# Patient Record
Sex: FEMALE | Race: WHITE | NOT HISPANIC OR LATINO | Employment: OTHER
[De-identification: names, ages, dates, MRNs, and addresses within clinical notes are randomized per-mention and may not be internally consistent; named-entity substitution may affect disease eponyms.]

---

## 2017-10-18 ENCOUNTER — SURGERY (OUTPATIENT)
Age: 72
End: 2017-10-18

## 2017-10-18 ENCOUNTER — HOSPITAL ENCOUNTER (OUTPATIENT)
Facility: CLINIC | Age: 72
Discharge: HOME | End: 2017-10-18
Attending: COLON & RECTAL SURGERY | Admitting: COLON & RECTAL SURGERY
Payer: MEDICARE

## 2017-10-18 VITALS
RESPIRATION RATE: 23 BRPM | WEIGHT: 153 LBS | DIASTOLIC BLOOD PRESSURE: 80 MMHG | SYSTOLIC BLOOD PRESSURE: 108 MMHG | HEART RATE: 73 BPM | OXYGEN SATURATION: 97 % | HEIGHT: 64 IN | BODY MASS INDEX: 26.12 KG/M2

## 2017-10-18 PROBLEM — F41.1 GENERALIZED ANXIETY DISORDER: Status: ACTIVE | Noted: 2017-10-18

## 2017-10-18 PROBLEM — Z83.49 FAMILY HISTORY OF OTHER ENDOCRINE AND METABOLIC DISEASES(V18.19): Status: ACTIVE | Noted: 2017-10-18

## 2017-10-18 PROBLEM — R32 URINARY INCONTINENCE: Status: ACTIVE | Noted: 2017-10-18

## 2017-10-18 PROBLEM — M89.9 DISORDER OF BONE AND CARTILAGE: Status: ACTIVE | Noted: 2017-10-18

## 2017-10-18 PROBLEM — R13.10 DYSPHAGIA: Status: ACTIVE | Noted: 2017-10-18

## 2017-10-18 PROBLEM — R00.2 PALPITATIONS: Status: ACTIVE | Noted: 2017-10-18

## 2017-10-18 PROBLEM — H35.379 MACULAR PUCKER: Status: ACTIVE | Noted: 2017-10-18

## 2017-10-18 PROBLEM — Z87.891 PERSONAL HISTORY OF TOBACCO USE, PRESENTING HAZARDS TO HEALTH: Status: ACTIVE | Noted: 2017-10-18

## 2017-10-18 PROBLEM — R26.89 IMBALANCE: Status: ACTIVE | Noted: 2017-10-18

## 2017-10-18 PROBLEM — R09.89 LABILE BLOOD PRESSURE: Status: ACTIVE | Noted: 2017-10-18

## 2017-10-18 PROBLEM — J30.0 VASOMOTOR RHINITIS: Status: ACTIVE | Noted: 2017-10-18

## 2017-10-18 PROBLEM — R60.9 EDEMA: Status: ACTIVE | Noted: 2017-10-18

## 2017-10-18 PROBLEM — E78.00 ELEVATED CHOLESTEROL: Status: ACTIVE | Noted: 2017-10-18

## 2017-10-18 PROBLEM — M79.18 MUSCULOSKELETAL PAIN: Status: ACTIVE | Noted: 2017-10-18

## 2017-10-18 PROBLEM — M94.9 DISORDER OF BONE AND CARTILAGE: Status: ACTIVE | Noted: 2017-10-18

## 2017-10-18 PROBLEM — K57.32 DIVERTICULITIS OF COLON: Status: ACTIVE | Noted: 2017-10-18

## 2017-10-18 PROBLEM — R73.01 IMPAIRED FASTING GLUCOSE: Status: ACTIVE | Noted: 2017-10-18

## 2017-10-18 LAB — COLONOSCOPY: NORMAL

## 2017-10-18 PROCEDURE — 88305 TISSUE EXAM BY PATHOLOGIST: CPT | Performed by: COLON & RECTAL SURGERY

## 2017-10-18 PROCEDURE — 88305 TISSUE EXAM BY PATHOLOGIST: CPT | Mod: 26 | Performed by: COLON & RECTAL SURGERY

## 2017-10-18 PROCEDURE — G0500 MOD SEDAT ENDO SERVICE >5YRS: HCPCS | Performed by: COLON & RECTAL SURGERY

## 2017-10-18 PROCEDURE — 99153 MOD SED SAME PHYS/QHP EA: CPT | Performed by: COLON & RECTAL SURGERY

## 2017-10-18 PROCEDURE — 25000128 H RX IP 250 OP 636: Performed by: COLON & RECTAL SURGERY

## 2017-10-18 PROCEDURE — 45385 COLONOSCOPY W/LESION REMOVAL: CPT | Mod: PT | Performed by: COLON & RECTAL SURGERY

## 2017-10-18 RX ORDER — FLUMAZENIL 0.1 MG/ML
0.2 INJECTION, SOLUTION INTRAVENOUS
Status: DISCONTINUED | OUTPATIENT
Start: 2017-10-18 | End: 2017-10-18 | Stop reason: HOSPADM

## 2017-10-18 RX ORDER — LIDOCAINE 40 MG/G
CREAM TOPICAL
Status: DISCONTINUED | OUTPATIENT
Start: 2017-10-18 | End: 2017-10-18 | Stop reason: HOSPADM

## 2017-10-18 RX ORDER — FENTANYL CITRATE 50 UG/ML
INJECTION, SOLUTION INTRAMUSCULAR; INTRAVENOUS PRN
Status: DISCONTINUED | OUTPATIENT
Start: 2017-10-18 | End: 2017-10-18 | Stop reason: HOSPADM

## 2017-10-18 RX ORDER — ONDANSETRON 2 MG/ML
4 INJECTION INTRAMUSCULAR; INTRAVENOUS
Status: DISCONTINUED | OUTPATIENT
Start: 2017-10-18 | End: 2017-10-18 | Stop reason: HOSPADM

## 2017-10-18 RX ORDER — ONDANSETRON 4 MG/1
4 TABLET, ORALLY DISINTEGRATING ORAL EVERY 6 HOURS PRN
Status: DISCONTINUED | OUTPATIENT
Start: 2017-10-18 | End: 2017-10-18 | Stop reason: HOSPADM

## 2017-10-18 RX ORDER — PROCHLORPERAZINE MALEATE 5 MG
5 TABLET ORAL EVERY 6 HOURS PRN
Status: DISCONTINUED | OUTPATIENT
Start: 2017-10-18 | End: 2017-10-18 | Stop reason: HOSPADM

## 2017-10-18 RX ORDER — MULTIPLE VITAMINS W/ MINERALS TAB 9MG-400MCG
1 TAB ORAL DAILY
COMMUNITY
End: 2022-09-09

## 2017-10-18 RX ORDER — L. ACIDOPHILUS/L.BULGARICUS 1MM CELL
TABLET ORAL
COMMUNITY
End: 2022-09-09

## 2017-10-18 RX ORDER — LORAZEPAM 0.5 MG/1
0.25 TABLET ORAL
COMMUNITY

## 2017-10-18 RX ORDER — NALOXONE HYDROCHLORIDE 0.4 MG/ML
.1-.4 INJECTION, SOLUTION INTRAMUSCULAR; INTRAVENOUS; SUBCUTANEOUS
Status: DISCONTINUED | OUTPATIENT
Start: 2017-10-18 | End: 2017-10-18 | Stop reason: HOSPADM

## 2017-10-18 RX ORDER — CHLORAL HYDRATE 500 MG
2 CAPSULE ORAL DAILY
COMMUNITY

## 2017-10-18 RX ORDER — ONDANSETRON 2 MG/ML
4 INJECTION INTRAMUSCULAR; INTRAVENOUS EVERY 6 HOURS PRN
Status: DISCONTINUED | OUTPATIENT
Start: 2017-10-18 | End: 2017-10-18 | Stop reason: HOSPADM

## 2017-10-18 RX ADMIN — MIDAZOLAM HYDROCHLORIDE 2 MG: 1 INJECTION, SOLUTION INTRAMUSCULAR; INTRAVENOUS at 10:27

## 2017-10-18 RX ADMIN — MIDAZOLAM HYDROCHLORIDE 1 MG: 1 INJECTION, SOLUTION INTRAMUSCULAR; INTRAVENOUS at 10:33

## 2017-10-18 RX ADMIN — FENTANYL CITRATE 100 MCG: 50 INJECTION, SOLUTION INTRAMUSCULAR; INTRAVENOUS at 10:26

## 2017-10-18 NOTE — H&P
Pre-Endoscopy History and Physical     Soledad Angel MRN# [id number]   YOB: 1945 Age: 72 year old     Date of Procedure: 10/18/2017  Primary care provider: Sara Gunn  Type of Endoscopy: colonoscopy  Reason for Procedure: screening  Type of Anesthesia Anticipated: moderate sedation    HPI:    Soledad is a 72 year old female who will be undergoing the above procedure.  Patient had a normal colonoscopy other than diverticulosis in 2009. Patient has had multiple episodes of diverticulitis in the past few years. She is careful about her fiber diet and fluid intake. She denies any bleeding.     A history and physical has been performed. The patient's medications and allergies have been reviewed. The risks and benefits of the procedure and the sedation options and risks were discussed with the patient.  All questions were answered and informed consent was obtained.      She denies a personal or family history of anesthesia complications or bleeding disorders.     Prior to Admission medications    Medication Sig Start Date End Date Taking? Authorizing Provider   ASPIRIN PO    Yes Reported, Patient   fluticasone (FLOVENT DISKUS) 50 MCG/BLIST AEPB Inhale 1 puff into the lungs every 12 hours   Yes Reported, Patient   INULIN PO Take 2.5 mg by mouth   Yes Reported, Patient   LORAZEPAM PO Take 0.5 mg by mouth   Yes Reported, Patient   fish oil-omega-3 fatty acids 1000 MG capsule Take 2 g by mouth daily   Yes Reported, Patient   multivitamin, therapeutic with minerals (MULTI-VITAMIN) TABS tablet Take 1 tablet by mouth daily   Yes Reported, Patient   Cholecalciferol (VITAMIN D-3 PO)    Yes Reported, Patient   Multiple Vitamins-Calcium (VIACTIV MULTI-VITAMIN) CHEW    Yes Reported, Patient   lactobacillus TABS    Yes Reported, Patient       Allergies   Allergen Reactions     Ciprofloxacin         No current facility-administered medications for this encounter.        Patient Active Problem List   Diagnosis  "    Vasomotor rhinitis     Urinary incontinence     Palpitations     Musculoskeletal pain     Macular pucker     Disorder of bone and cartilage     Labile blood pressure     Impaired fasting glucose     Imbalance     Personal history of tobacco use, presenting hazards to health     Family history of other endocrine and metabolic diseases(V18.19)     Elevated cholesterol     Edema     Dysphagia     Diverticulitis of colon     Generalized anxiety disorder     Abnormal CT scan        Past Medical History:   Diagnosis Date     Diverticular disease         Past Surgical History:   Procedure Laterality Date     TONSILLECTOMY         Social History   Substance Use Topics     Smoking status: Former Smoker     Types: Cigarettes     Quit date: 5/15/1999     Smokeless tobacco: Never Used     Alcohol use Yes      Comment: 2 glasses of wine/week maximum       History reviewed. No pertinent family history.    REVIEW OF SYSTEMS:     5 point ROS negative except as noted above in HPI, including Gen., Resp., CV, GI &  system review.      PHYSICAL EXAM:   /90  Pulse 73  Resp 18  Ht 1.626 m (5' 4\")  Wt 69.4 kg (153 lb)  SpO2 95%  BMI 26.26 kg/m2 Estimated body mass index is 26.26 kg/(m^2) as calculated from the following:    Height as of this encounter: 1.626 m (5' 4\").    Weight as of this encounter: 69.4 kg (153 lb).   GENERAL APPEARANCE: healthy  MENTAL STATUS: alert  AIRWAY EXAM: Mallampatti Class II (visualization of the soft palate, fauces, and uvula)  RESP: lungs clear to auscultation - no rales, rhonchi or wheezes  CV: regular rates and rhythm      DIAGNOSTICS:    Not indicated      IMPRESSION   ASA Class 2 - Mild systemic disease        PLAN:       Colonoscopy with possible polypectomy, possible biopsy. The indications, procedure and risks were explained to the patient who agrees to proceed.       The above has been forwarded to the consulting provider.      Signed Electronically by: Viktoriya South  October " 18, 2017

## 2017-10-18 NOTE — BRIEF OP NOTE
Brief Operative Note    Pre-operative diagnosis: DIVERTICULITIS   Post-operative diagnosis colon polyp, diverticulosis     Procedure: Procedure(s):  COLONOSCOPY - Wound Class: II-Clean Contaminated   Surgeon(s): Surgeon(s) and Role:     * Viktoriya South MD - Primary   Estimated blood loss: * No values recorded between 10/18/2017 12:00 AM and 10/18/2017 10:56 AM *    Specimens:   ID Type Source Tests Collected by Time Destination   A : hot snare Polyp Large Intestine, Right/Ascending SURGICAL PATHOLOGY EXAM Masha Jim RN 10/18/2017 10:48 AM       Findings: See Provation procedure note in Epic

## 2017-10-19 LAB — COPATH REPORT: NORMAL

## 2017-10-25 ENCOUNTER — HOSPITAL ENCOUNTER (INPATIENT)
Facility: CLINIC | Age: 72
Setting detail: SURGERY ADMIT
End: 2017-10-25
Attending: COLON & RECTAL SURGERY | Admitting: COLON & RECTAL SURGERY
Payer: MEDICARE

## 2017-11-04 ENCOUNTER — APPOINTMENT (OUTPATIENT)
Dept: GENERAL RADIOLOGY | Facility: CLINIC | Age: 72
End: 2017-11-04
Attending: PHYSICIAN ASSISTANT
Payer: MEDICARE

## 2017-11-04 ENCOUNTER — APPOINTMENT (OUTPATIENT)
Dept: GENERAL RADIOLOGY | Facility: CLINIC | Age: 72
End: 2017-11-04
Attending: EMERGENCY MEDICINE
Payer: MEDICARE

## 2017-11-04 ENCOUNTER — HOSPITAL ENCOUNTER (EMERGENCY)
Facility: CLINIC | Age: 72
Discharge: HOME | End: 2017-11-04
Attending: PHYSICIAN ASSISTANT | Admitting: PHYSICIAN ASSISTANT
Payer: MEDICARE

## 2017-11-04 VITALS
WEIGHT: 153 LBS | DIASTOLIC BLOOD PRESSURE: 100 MMHG | HEIGHT: 64 IN | TEMPERATURE: 98 F | SYSTOLIC BLOOD PRESSURE: 149 MMHG | RESPIRATION RATE: 16 BRPM | BODY MASS INDEX: 26.12 KG/M2 | OXYGEN SATURATION: 96 %

## 2017-11-04 DIAGNOSIS — S52.592A OTHER CLOSED FRACTURE OF DISTAL END OF LEFT RADIUS, INITIAL ENCOUNTER: ICD-10-CM
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
## 2017-11-04 DIAGNOSIS — S52.615A CLOSED NONDISPLACED FRACTURE OF STYLOID PROCESS OF LEFT ULNA, INITIAL ENCOUNTER: ICD-10-CM

## 2017-11-04 PROCEDURE — 40000277 XR SURGERY CARM FLUORO LESS THAN 5 MIN W STILLS

## 2017-11-04 PROCEDURE — A9270 NON-COVERED ITEM OR SERVICE: HCPCS | Mod: GY | Performed by: PHYSICIAN ASSISTANT

## 2017-11-04 PROCEDURE — 73110 X-RAY EXAM OF WRIST: CPT | Mod: LT

## 2017-11-04 PROCEDURE — 99285 EMERGENCY DEPT VISIT HI MDM: CPT | Mod: 25

## 2017-11-04 PROCEDURE — 40000986 XR WRIST LT  2 VW: Mod: LT

## 2017-11-04 PROCEDURE — 25600 CLTX DST RDL FX/EPHYS SEP WO: CPT | Mod: LT

## 2017-11-04 PROCEDURE — 25000132 ZZH RX MED GY IP 250 OP 250 PS 637: Mod: GY | Performed by: PHYSICIAN ASSISTANT

## 2017-11-04 RX ORDER — OXYCODONE HYDROCHLORIDE 5 MG/1
5 TABLET ORAL EVERY 6 HOURS PRN
Qty: 15 TABLET | Refills: 0 | Status: SHIPPED | OUTPATIENT
Start: 2017-11-04 | End: 2017-12-21

## 2017-11-04 RX ORDER — ACETAMINOPHEN 500 MG
1000 TABLET ORAL ONCE
Status: COMPLETED | OUTPATIENT
Start: 2017-11-04 | End: 2017-11-04

## 2017-11-04 RX ADMIN — ACETAMINOPHEN 1000 MG: 500 TABLET, FILM COATED ORAL at 17:14

## 2017-11-04 NOTE — ED NOTES
"Emergency Department Attending Supervision Note  11/4/2017  4:22 PM      I evaluated this patient in conjunction with Sherrie Fried PA-C.    Briefly, the patient presented with a mechanical fall. The patient tripped while walking outside and fell onto her left wrist without loss of consciousness. The patient presents to the emergency department because of the pain in her wrist/deformity. The patient has normal sensation in her left fingers, with some decreased movement and \"shooting pain\" with movement. Of note, the patient is right handed.     On my exam:  General: Patient in mild distress.  Alert and cooperative with exam. Normal mentation  HEENT: NC/AT. Conjunctiva without injection or scleral icterus. External ears normal.  Respiratory: Breathing comfortably on room air  CV: Normal rate, all extremities well perfused  GI:  Non-distended abdomen  Skin: Warm, dry, no rashes/open wounds on exposed skin  Musculoskeletal: LUE: CMS intact, obvious deformity of the wrist with dorsal displacement of the distal fracture fragment. Elbow and shoulder exam normal.   Neuro: Alert, answers questions appropriately. No gross motor deficits    Procedures:    Reduction     SITE: Left wrist     PROCEDURE PROVIDER: Dr. Navarrete     REDUCTION COMMENTS: The patient's left wrist was held in traction and internally and externally rotated until a \"pop\" was felt. The patient's let wrist then appeared reduced with improved alignment. Post reductions X-rays were obtained and showed improved reduction.     Splint Placement    PLACEMENT: A plaster sugar tong splint was applied to the left upper extremity and after placement I checked and adjusted the fit to ensure proper positioning. The patient was more comfortable with the splint in place. Sensation and circulation are intact after splint placement.       Course:  1658: I reduced the patient's wrist.   1724: I reentered the room to recheck the patient.  1754: I went back to speak to the " patient after her x-ray's came back showing little improvement post reduction and split placement. The patient will be moved to a Stab room so that I can repair the arm again.  1803: The patient was moved to Stab 1.  1833: I entered the room to place a new cast on the patient, as the first one had been ineffective.   1835: I exited the room.  1840: I reentered the room to complete the procedure, and to make sure everything was in place.     My impression:  Soledad Angel is a 72 year old female who presents for evaluation of wrist pain after fall. CMS is intact distally in the extremity. Initial attempt at fracture reduction demonstrated only mild improvement, second reduction done using C-arm again with only somewhat improved alignment; fracture comminuted/unstable. There is no indication for ortho consultation from the ED. Rather, close follow-up is indicated in the next days.  Splint and fracture precautions for home.      Diagnosis:    ICD-10-CM    1. Other closed fracture of distal end of left radius, initial encounter S52.592A    2. Closed nondisplaced fracture of styloid process of left ulna, initial encounter S52.615A    3. Fall, initial encounter W19.XXXA      Scribe Disclosure:  I, Ibis Packer, am serving as a scribe at 4:22 PM on 11/4/2017 to document services personally performed by Anton Navarrete DO based on my observations and the provider's statements to me.            Anton Navarrete DO  11/05/17 2236

## 2017-11-04 NOTE — ED AVS SNAPSHOT
Emergency Department    [address] [facility] [address]    Phone:  [phone]    Fax:  [phone]                                       Soledad Angel   MRN: [id number]    Department:   Emergency Department   Date of Visit:  11/4/2017           Patient Information     Date Of Birth          1945        Your diagnoses for this visit were:     Other closed fracture of distal end of left radius, initial encounter     Closed nondisplaced fracture of styloid process of left ulna, initial encounter     Fall, initial encounter        You were seen by Sherrie Fried PA-C and Anton Navarrete DO.      Follow-up Information     Follow up with  Emergency Department.    Specialty:  EMERGENCY MEDICINE    Why:  If symptoms worsen    Contact information:    [address] [facility] [address] [phone]        Follow up with Orthopaedics, [facility] In 3 days.    Contact information:    [address] [address] [phone]          Discharge Instructions         Rest, elevate, ibuprofen or tylenol for pain. Tramadol for severe pain.   Follow up with orthopedics in 3 days. Contact information provided.   Return if worsening pain, finger discoloration, or any other new concerns.     Discharge Instructions  Splint Care    You had a splint put on today to help protect your injury and help it heal.  Splints are used to treat things like strains, sprains, large cuts, and fractures (broken bones). Splints are temporary and are designed to allow for swelling.    Be sure your splint is not too tight!  If you splint is too tight, it may cause loss of blood supply.  Signs of your splint being too tight include: your arm or leg hurting a lot more; your fingers or toes getting numb, cold, pale or blue; or your child is crying, fussing or seeming restless.    Generally, every Emergency Department visit should have a follow-up clinic visit with  either a primary or a specialty clinic/provider. Please follow-up as instructed by your emergency provider today.  Return to the Emergency Department right away if:    You have increased pain or pressure around the injury.    You have numbness, tingling, or cool, pale, or blue toes or fingers past the injury.    Your child is more fussy than normal, crying a lot, or restless.    Your splint becomes soft, breaks, or is wet.    Your splint begins to smell bad.    Your splint is cutting into your skin.    Home care:    Keep the injured area above the level of your heart while laying or sitting down.  This will help decrease the swelling and the pain.    Keep the splint dry.    Do not put objects down or inside the splint.    If there is an elastic bandage (Ace  wrap) holding the splint on this may be loosened slightly to relieve pressure or pain.  If pain continues return to the Emergency Department right away.    Do not remove your splint by yourself unless told to by your provider.    Follow-up:  Sometimes the splint put on in the Emergency Department needs to be changed once the swelling has gone down and a more permanent cast needs to be placed.  This is usually done by a bone specialist provider (Orthopedist).  Follow the instructions given to you by your provider today.    If you were given a prescription for medicine here today, be sure to read all of the information (including the package insert) that comes with your prescription.  This will include important information about the medicine, its side effects, and any warnings that you need to know about.  The pharmacist who fills the prescription can provide more information and answer questions you may have about the medicine.  If you have questions or concerns that the pharmacist cannot address, please call or return to the Emergency Department.     Remember that you can always come back to the Emergency Department if you are not able to see your regular  provider in the amount of time listed above, if you get any new symptoms, or if there is anything that worries you.      24 Hour Appointment Hotline       To make an appointment at any [facility], call [phone] ([phone]). If you don't have a family doctor or clinic, we will help you find one. [place] clinics are conveniently located to serve the needs of you and your family.             Review of your medicines      START taking        Dose / Directions Last dose taken    oxyCODONE IR 5 MG tablet   Commonly known as:  ROXICODONE   Dose:  5 mg   Quantity:  15 tablet        Take 1 tablet (5 mg) by mouth every 6 hours as needed for pain   Refills:  0          Our records show that you are taking the medicines listed below. If these are incorrect, please call your family doctor or clinic.        Dose / Directions Last dose taken    ASPIRIN PO        Refills:  0        fish oil-omega-3 fatty acids 1000 MG capsule   Dose:  2 g        Take 2 g by mouth daily   Refills:  0        fluticasone 50 MCG/BLIST Aepb   Commonly known as:  FLOVENT DISKUS   Dose:  1 puff        Inhale 1 puff into the lungs every 12 hours   Refills:  0        INULIN PO   Dose:  2.5 mg        Take 2.5 mg by mouth   Refills:  0        lactobacillus Tabs        Refills:  0        LORAZEPAM PO   Dose:  0.5 mg        Take 0.5 mg by mouth   Refills:  0        Multi-vitamin Tabs tablet   Dose:  1 tablet        Take 1 tablet by mouth daily   Refills:  0        VIACTIV MULTI-VITAMIN Chew        Refills:  0        VITAMIN D-3 PO        Refills:  0                Prescriptions were sent or printed at these locations (1 Prescription)                   Other Prescriptions                Printed at Department/Unit printer (1 of 1)         oxyCODONE IR (ROXICODONE) 5 MG tablet                Procedures and tests performed during your visit     IV access    Wrist XR, G/E 3 views, left    XR Surgery RICAHRD L/T 5 Min Fluoro w Stills    XR Wrist Left 2  Views      Orders Needing Specimen Collection     None      Pending Results     Date and Time Order Name Status Description    11/4/2017 1901 XR Surgery RICHARD L/T 5 Min Fluoro w Stills Preliminary             Pending Culture Results     No orders found from 11/2/2017 to 11/5/2017.            Pending Results Instructions     If you had any lab results that were not finalized at the time of your Discharge, you can call the ED Lab Result RN at [phone]. You will be contacted by this team for any positive Lab results or changes in treatment. The nurses are available 7 days a week from 10A to 6:30P.  You can leave a message 24 hours per day and they will return your call.        Test Results From Your Hospital Stay        11/4/2017  4:58 PM      Narrative     WRIST LEFT THREE OR MORE VIEWS   11/4/2017 4:03 PM     HISTORY: Pain after trauma.    COMPARISON: None.    FINDINGS: Moderately displaced fracture of the distal left radial  shaft with marked dorsal angulation of the distal fracture fragment.  There is also an ulnar styloid fracture.        Impression     IMPRESSION: Fracture distal left radial shaft and ulnar styloid.    TIMOTHY CONNOR MD               11/4/2017  5:47 PM      Narrative     XR WRIST LT  2 VW 11/4/2017 5:45 PM    HISTORY: Reduction.    COMPARISON: November 4, 2017.        Impression     IMPRESSION: Interval placement of overlying cast material, which  limits evaluation. Comminuted intra-articular fracture of the distal  left radius, as before, with fracture fragments slightly improved in  alignment compared to prior exam.    SKIP HARRISON MD         11/4/2017  7:15 PM      Narrative     SURGERY C-ARM FLUOROSCOPY LESS THAN 5 MINUTES WITH STILLS   11/4/2017  7:02 PM     HISTORY: Postreduction.    COMPARISON: Postreduction casted view earlier today.    FINDINGS: Two C-arm views of the left wrist show fracture of the  distal left radial shaft with dorsal angulation of the distal fracture  fragment  and moderate displacement. Ulnar styloid fracture was seen on  the earlier study but not clearly seen on the C-arm views.        Impression     IMPRESSION: Distal left radial shaft fracture with dorsal angulation  of the distal fracture fragment.                Clinical Quality Measure: Blood Pressure Screening     Your blood pressure was checked while you were in the emergency department today. The last reading we obtained was  BP: (!) 142/96 . Please read the guidelines below about what these numbers mean and what you should do about them.  If your systolic blood pressure (the top number) is less than 120 and your diastolic blood pressure (the bottom number) is less than 80, then your blood pressure is normal. There is nothing more that you need to do about it.  If your systolic blood pressure (the top number) is 120-139 or your diastolic blood pressure (the bottom number) is 80-89, your blood pressure may be higher than it should be. You should have your blood pressure rechecked within a year by a primary care provider.  If your systolic blood pressure (the top number) is 140 or greater or your diastolic blood pressure (the bottom number) is 90 or greater, you may have high blood pressure. High blood pressure is treatable, but if left untreated over time it can put you at risk for heart attack, stroke, or kidney failure. You should have your blood pressure rechecked by a primary care provider within the next 4 weeks.  If your provider in the emergency department today gave you specific instructions to follow-up with your doctor or provider even sooner than that, you should follow that instruction and not wait for up to 4 weeks for your follow-up visit.        Thank you for choosing [facility]       Thank you for choosing [facility] for your care. Our goal is always to provide you with excellent care. Hearing back from our patients is one way we can continue to improve our services. Please take a few minutes to  "complete the written survey that you may receive in the mail after you visit with us. Thank you!        [employer] Information     [employer] lets you send messages to your doctor, view your test results, renew your prescriptions, schedule appointments and more. To sign up, go to [email or web address] . Click on \"Log in\" on the left side of the screen, which will take you to the Welcome page. Then click on \"Sign up Now\" on the right side of the page.     You will be asked to enter the access code listed below, as well as some personal information. Please follow the directions to create your username and password.     Your access code is: [email or web address]  Expires: 2018  5:50 PM     Your access code will  in 90 days. If you need help or a new code, please call your [facility] clinic or [phone].        Care EveryWhere ID     This is your Care EveryWhere ID. This could be used by other organizations to access your [facility] medical records  [id number]        Equal Access to Services     ELLEN VO : Hadii kwame cummings Sopam, waaxda luqadaha, qaybta kaalmajayden avalos, nell dos santos . So [facility] [phone].    ATENCIÓN: Si habla español, tiene a mendiola disposición servicios gratuitos de asistencia lingüística. Llame al [phone].    We comply with applicable federal civil rights laws and [place] laws. We do not discriminate on the basis of race, color, national origin, age, disability, sex, sexual orientation, or gender identity.            After Visit Summary       This is your record. Keep this with you and show to your community pharmacist(s) and doctor(s) at your next visit.                  "

## 2017-11-04 NOTE — DISCHARGE INSTRUCTIONS
Rest, elevate, ibuprofen or tylenol for pain. Tramadol for severe pain.   Follow up with orthopedics in 3 days. Contact information provided.   Return if worsening pain, finger discoloration, or any other new concerns.     Discharge Instructions  Splint Care    You had a splint put on today to help protect your injury and help it heal.  Splints are used to treat things like strains, sprains, large cuts, and fractures (broken bones). Splints are temporary and are designed to allow for swelling.    Be sure your splint is not too tight!  If you splint is too tight, it may cause loss of blood supply.  Signs of your splint being too tight include: your arm or leg hurting a lot more; your fingers or toes getting numb, cold, pale or blue; or your child is crying, fussing or seeming restless.    Generally, every Emergency Department visit should have a follow-up clinic visit with either a primary or a specialty clinic/provider. Please follow-up as instructed by your emergency provider today.  Return to the Emergency Department right away if:    You have increased pain or pressure around the injury.    You have numbness, tingling, or cool, pale, or blue toes or fingers past the injury.    Your child is more fussy than normal, crying a lot, or restless.    Your splint becomes soft, breaks, or is wet.    Your splint begins to smell bad.    Your splint is cutting into your skin.    Home care:    Keep the injured area above the level of your heart while laying or sitting down.  This will help decrease the swelling and the pain.    Keep the splint dry.    Do not put objects down or inside the splint.    If there is an elastic bandage (Ace  wrap) holding the splint on this may be loosened slightly to relieve pressure or pain.  If pain continues return to the Emergency Department right away.    Do not remove your splint by yourself unless told to by your provider.    Follow-up:  Sometimes the splint put on in the Emergency  Department needs to be changed once the swelling has gone down and a more permanent cast needs to be placed.  This is usually done by a bone specialist provider (Orthopedist).  Follow the instructions given to you by your provider today.    If you were given a prescription for medicine here today, be sure to read all of the information (including the package insert) that comes with your prescription.  This will include important information about the medicine, its side effects, and any warnings that you need to know about.  The pharmacist who fills the prescription can provide more information and answer questions you may have about the medicine.  If you have questions or concerns that the pharmacist cannot address, please call or return to the Emergency Department.     Remember that you can always come back to the Emergency Department if you are not able to see your regular provider in the amount of time listed above, if you get any new symptoms, or if there is anything that worries you.

## 2017-11-04 NOTE — ED PROVIDER NOTES
"I saw the above patient and shared service with Dr. Tate. Please see his brief note for additional details.      History     Chief Complaint:  Left wrist injury    HPI   Soledad Angel is a right hand dominant 72 year old female who presents to the emergency department for evaluation of a left wrist injury after a fall. [place] reports tripping on a piece of the side walk that was in the process of being repaired, falling onto her left wrist. She denies hitting her head and any loss of consciousness during the fall. The immediate onset of pain and deformity in her left wrist prompted the patient to seek evaluation here in emergency department. Here, the patient reports normal sensation in her left fingers and reports decreased movement in her fingers. She notes shooting pain in her fingers up into her wrist if she tries to move her thumb and pointer finger. She denies left shoulder pain and left elbow pain. She denies sustaining any other injuries during the mechanical fall. She is not on any blood thinners.     Allergies:  Ciprofloxacin    Medications:    Fluticasone  Inulin  Lorazepam  lactobacillus    Past Medical History:    Vasomotor rhinitis  Urinary incontinence  Palpitations  Hypercholesteremia   Anxiety  Diverticular disease    Past Surgical History:    Tonsillectomy    Family History:    No past pertinent family history.    Social History:  Former smoker: quit date 5/15/1999  Alcohol use: 2 glasses a week     Review of Systems   Musculoskeletal:        Positive for left wrist pain.    All other systems reviewed and are negative.    Physical Exam     Patient Vitals for the past 24 hrs:   BP Temp Temp src Resp SpO2 Height Weight   11/04/17 1936 - - - 16 - - -   11/04/17 1930 (!) 149/100 - - - 96 % - -   11/04/17 1924 - - - 16 - - -   11/04/17 1710 (!) 142/96 - - - - - -   11/04/17 1554 (!) 162/100 98  F (36.7  C) Temporal 18 - 1.626 m (5' 4\") " 69.4 kg (153 lb)   11/04/17 1548 - - - - 91 % - -          Physical Exam  Nursing note and vitals reviewed.     GENERAL: Alert, mild distress.   HEENT: Normal conjunctiva. No scleral icterus. MMM.  NECK: Supple. Normal ROM.   CARDIAC: Intact distal radial pulses 2+ and symmetric. Capillary refill less than 2 seconds.   PULMONARY: Breathing comfortably at rest.    NEURO: Alert and oriented. Non-focal. Sensation intact to light touch distally in left upper extremity.   MUSCULOSKELETAL:   Left hand: no tenderness or swelling over dorsum of the hand, no focal tenderness over scaphoid; flexion and extension of all fingers intact.  Left wrist: obvious deformity to distal wrist with associated tenderness and swelling; no breaks in the skin. Limited ROM due to pain.   Left elbow: no tenderness or swelling, full flexion and extension intact.   SKIN: Skin is warm and dry. No rashes. No pallor or jaundice.   PSYCH: Normal affect and mood.      Emergency Department Course   Imaging:  Radiographic findings were communicated with the patient who voiced understanding of the findings.    XR wrist, left, G/E 3 views:   Fracture distal left radial shaft and ulnar styloid. As per radiology.     XR wrist, left 2 views: Post-reduction  Interval placement of overlying cast material, which  limits evaluation. Comminuted intra-articular fracture of the distal  left radius, as before, with fracture fragments slightly improved in  alignment compared to prior exam. As per radiology.     XR Surgery RICHARD L/T 5 Min Fluoro w Stills:  Distal left radial shaft fracture with dorsal angulation  of the distal fracture fragment. As per radiology.     Procedures:  Hematoma block, reduction, and plaster sugar tong performed by Dr. Beltre. Please see his note for further details.     Interventions:  1714 Tylenol 1000 mg PO    Emergency Department Course:  1550 Nursing notes and vitals reviewed.  I performed an exam of the patient as documented above.      IV inserted. Medicine administered as documented above. Blood drawn. This was sent to the lab for further testing, results above.    The patient was sent for a left wrist xray while in the emergency department, findings above.     1646 I rechecked the patient and discussed the results of her workup thus far.     The patient had her left wrist reduced by Dr. Beltre while here in the emergency department. See Dr. Beltre's notes regarding procedure details.     The patient was sent for a post-reduction xray. See findings above. These were explained to the patient.     Per post-reduction xray findings, Dr. Beltre elected to try reducing the fracture again. See Dr. Beltre's note for details.    Findings and plan explained to the Patient. Patient discharged home with instructions regarding supportive care, medications, and reasons to return. The importance of close follow-up was reviewed. The patient was prescribed Oxycodone.     I personally answered all related questions prior to discharge.   Impression & Plan    Medical Decision Making:  This is a 72 year old right hand dominant female who presents with left wrist deformity and pain after a mechanical fall. No head injury or LOC and she is not on blood thinners. She has an obvious deformity on exam and xrays reveal a fracture of distal radial shaft and ulnar styloid. She is neurovascularly intact in this extremity. No signs of compartment syndrome. This did require reduction for more appropriate alignment. Dr. Tate performed a hematoma block followed by reduction, which was repeated for more appropriate alignment. See his procedure note for further details. Alignment slightly improved. She was immobilized with sugar tong splint, see Dr. Tate's procedure note for further details. She remained neurovascularly intact following this procedure. I discussed the possibility of this requiring surgery, though she will need to have further evaluation/follow up with  orthopedics this week. Contact information for [facility] provided. Advised to take tylenol or ibuprofen for pain and will provide oxycodone for more severe pain. Reviewed to avoid drinking alcohol or driving if taking the narcotic medication and side effects of constipation reviewed. No other acute injuries noted on exam. Reviewed reasons to return to ED, including worsening symptoms, finger discoloration, numbness, fevers, or any new concerns. The patient was in agreement with plan and discharged in satisfactory condition with all questions answered.      Of note, blood pressure elevated here. No prior history of hypertension. Suspect related to pain. No clinical history concerning for end organ damage. Advised to have rechecked with PCP.     Diagnosis:    ICD-10-CM    1. Other closed fracture of distal end of left radius, initial encounter S52.592A    2. Closed nondisplaced fracture of styloid process of left ulna, initial encounter S52.615A    3. Fall, initial encounter W19.XXXA        Disposition:  discharged to home    Discharge Medications:  New Prescriptions    OXYCODONE IR (ROXICODONE) 5 MG TABLET    Take 1 tablet (5 mg) by mouth every 6 hours as needed for pain       Juanita BENITO, am serving as a scribe on 11/4/2017 at 3:50 PM to personally document services performed by Sherrie Fried PA-C based on my observations and the provider's statements to me.     Juanita Wise  11/4/2017    EMERGENCY DEPARTMENT       Sherrie Fried PA-C  11/05/17 1110

## 2017-11-04 NOTE — ED AVS SNAPSHOT
Emergency Department    [address] [facility] [address]    Phone:  [phone]    Fax:  [phone]                                       Soledad Angel   MRN: [id number]    Department:   Emergency Department   Date of Visit:  11/4/2017           After Visit Summary Signature Page     I have received my discharge instructions, and my questions have been answered. I have discussed any challenges I see with this plan with the nurse or doctor.    ..........................................................................................................................................  Patient/Patient Representative Signature      ..........................................................................................................................................  Patient Representative Print Name and Relationship to Patient    ..................................................               ................................................  Date                                            Time    ..........................................................................................................................................  Reviewed by Signature/Title    ...................................................              ..............................................  Date                                                            Time

## 2017-11-04 NOTE — Clinical Note
Tylenol dosing   Regular strength: 650 mg every 4 to 6 hours  Extra strength: 1000 mg every 6 hours  Maximum daily dose: 3000 mg daily

## 2017-12-20 ENCOUNTER — HOSPITAL ENCOUNTER (EMERGENCY)
Facility: CLINIC | Age: 72
Discharge: HOME | End: 2017-12-21
Attending: EMERGENCY MEDICINE | Admitting: EMERGENCY MEDICINE
Payer: MEDICARE

## 2017-12-20 DIAGNOSIS — R10.30 ABDOMINAL PAIN, LOWER: ICD-10-CM

## 2017-12-20 DIAGNOSIS — R14.0 ABDOMINAL BLOATING: ICD-10-CM

## 2017-12-20 PROCEDURE — 99282 EMERGENCY DEPT VISIT SF MDM: CPT

## 2017-12-20 NOTE — ED AVS SNAPSHOT
Emergency Department    [address] [facility] [address]    Phone:  [phone]    Fax:  [phone]                                       Soledad Angel   MRN: [id number]    Department:   Emergency Department   Date of Visit:  12/20/2017           After Visit Summary Signature Page     I have received my discharge instructions, and my questions have been answered. I have discussed any challenges I see with this plan with the nurse or doctor.    ..........................................................................................................................................  Patient/Patient Representative Signature      ..........................................................................................................................................  Patient Representative Print Name and Relationship to Patient    ..................................................               ................................................  Date                                            Time    ..........................................................................................................................................  Reviewed by Signature/Title    ...................................................              ..............................................  Date                                                            Time

## 2017-12-20 NOTE — ED AVS SNAPSHOT
Emergency Department    [address] [facility] [address]    Phone:  [phone]    Fax:  [phone]                                       Soledad Angel   MRN: [id number]    Department:   Emergency Department   Date of Visit:  12/20/2017           Patient Information     Date Of Birth          1945        Your diagnoses for this visit were:     Abdominal pain, lower     Abdominal bloating        You were seen by Kyle Mack MD.      Follow-up Information     Please follow up.    Why:  start Augmentin tonight - return if worsening or any new concerns        Discharge Instructions         Probable Diverticulitis    Some people get pouches along the wall of the colon as they get older. The pouches, called diverticuli, usually cause no symptoms. If the pouches become blocked, you can get an infection. This infection is called diverticulitis. It causes pain in your lower abdomen and fever. If not treated, it can become a serious condition, causing an abscess to form inside the pouch. The abscess may block the intestinal tract even or rupture, spreading infection throughout the abdomen.  When treatment is started early, oral antibiotics alone may be enough to cure diverticulitis. This method is tried first. But, if you don't improve or if your condition gets worse while using oral antibiotics, you may need to be admitted to the hospital for IV antibiotics. Severe cases may require surgery.  Home care  The following guidelines will help you care for yourself at home:    During the acute illness, rest and follow your healthcare provider's instructions about diet. Sometimes you will need to follow a clear liquid diet to rest your bowel. Once your symptoms are better, you may be told to follow a low-fiber diet for some time. Include foods like:    Flake cereal, mashed potatoes, pancakes, waffles, pasta, white bread, rice, applesauce, bananas, eggs, fish, poultry, tofu, and cooked soft  vegetables    Take antibiotics exactly as instructed. Don't miss any doses or stop taking the medication, even if you feel better.    Monitor your temperature and tell your healthcare provider if you have rising temperatures.  Preventing future attacks  Once you have an episode of diverticulitis, you are at risk for having it again. After you have recovered from this episode, you may be able to lower your risk by eating a high-fiber diet (20 gm/day to 35 gm/day of fiber). This cleans out the colon pouches that already exist and may prevent new ones from forming. Foods high in fiber include fresh fruits and edible peelings, raw or lightly cooked vegetables, whole grain cereals and breads, dried beans and peas, and bran.  Other steps that can help prevent future attacks include:    Take your medicines, such as antibiotics, as your healthcare provider says.    Drink 6 to 8 glasses of water every day, unless told otherwise.    Use a heating pad or hot water bottle to help abdominal cramping or pain.    Begin an exercise program. Ask your healthcare provider how to get started. You can benefit from simple activities such as walking or gardening.    Treat diarrhea with a bland diet. Start with liquids only; then slowly add fiber over time.    Watch for changes in your bowel movements (constipation to diarrhea). Avoid constipation by eating a high fiber diet and taking a stool softener if needed.    Get plenty of rest and sleep.  Follow-up care  Follow up with your healthcare provider as advised or sooner if you are not getting better in the next 2 days.  When to seek medical advice  Call your healthcare provider right away if any of these occur:    Fever of 100.4 F (38 C) or higher, or as directed by your healthcare provider    Repeated vomiting or swelling of the abdomen    Weakness, dizziness, light-headedness    Pain in your abdomen that gets worse, severe, or spreads to your back    Pain that moves to the right lower  abdomen    Rectal bleeding (stools that are red, black or maroon color)    Unexpected vaginal bleeding  Date Last Reviewed: 9/1/2016 2000-2017 [employer]. [address] [place] [address], [place] [address]. All rights reserved. This information is not intended as a substitute for professional medical care. Always follow your healthcare professional's instructions.          24 Hour Appointment Hotline       To make an appointment at any [facility] [place], call [phone] ([phone]). If you don't have a family doctor or clinic, we will help you find one. [facility] are conveniently located to serve the needs of you and your family.             Review of your medicines      START taking        Dose / Directions Last dose taken    amoxicillin-clavulanate 875-125 MG per tablet   Commonly known as:  AUGMENTIN   Dose:  1 tablet   Quantity:  20 tablet        Take 1 tablet by mouth 2 times daily for 10 days   Refills:  0          Our records show that you are taking the medicines listed below. If these are incorrect, please call your family doctor or clinic.        Dose / Directions Last dose taken    ASPIRIN PO   Dose:  81 mg        Take 81 mg by mouth every other day   Refills:  0        fish oil-omega-3 fatty acids 1000 MG capsule   Dose:  2 g        Take 2 g by mouth daily   Refills:  0        fluticasone 50 MCG/BLIST Aepb   Commonly known as:  FLOVENT DISKUS   Dose:  1 puff        Inhale 1 puff into the lungs every 12 hours   Refills:  0        INULIN PO   Dose:  2.5 mg        Take 2.5 mg by mouth   Refills:  0        lactobacillus Tabs        Refills:  0        LORAZEPAM PO   Dose:  0.5 mg        Take 0.5 mg by mouth   Refills:  0        Multi-vitamin Tabs tablet   Dose:  1 tablet        Take 1 tablet by mouth daily   Refills:  0        VIACTIV MULTI-VITAMIN Chew        Refills:  0        VITAMIN D-3 PO        Refills:  0                Prescriptions were sent or printed at these locations (1  Prescription)                   [id number]  [facility] - [address] [facility]   [address] [place] [address]    Telephone:  [phone]   Fax:  [phone]   Hours:                  E-Prescribed (1 of 1)         amoxicillin-clavulanate (AUGMENTIN) 875-125 MG per tablet                Orders Needing Specimen Collection     None      Pending Results     No orders found for last 3 day(s).            Pending Culture Results     No orders found for last 3 day(s).            Pending Results Instructions     If you had any lab results that were not finalized at the time of your Discharge, you can call the ED Lab Result RN at [phone]. You will be contacted by this team for any positive Lab results or changes in treatment. The nurses are available 7 days a week from 10A to 6:30P.  You can leave a message 24 hours per day and they will return your call.        Test Results From Your Hospital Stay               Clinical Quality Measure: Blood Pressure Screening     Your blood pressure was checked while you were in the emergency department today. The last reading we obtained was  BP: (!) 138/94 . Please read the guidelines below about what these numbers mean and what you should do about them.  If your systolic blood pressure (the top number) is less than 120 and your diastolic blood pressure (the bottom number) is less than 80, then your blood pressure is normal. There is nothing more that you need to do about it.  If your systolic blood pressure (the top number) is 120-139 or your diastolic blood pressure (the bottom number) is 80-89, your blood pressure may be higher than it should be. You should have your blood pressure rechecked within a year by a primary care provider.  If your systolic blood pressure (the top number) is 140 or greater or your diastolic blood pressure (the bottom number) is 90 or greater, you may have high blood pressure. High blood pressure is  "treatable, but if left untreated over time it can put you at risk for heart attack, stroke, or kidney failure. You should have your blood pressure rechecked by a primary care provider within the next 4 weeks.  If your provider in the emergency department today gave you specific instructions to follow-up with your doctor or provider even sooner than that, you should follow that instruction and not wait for up to 4 weeks for your follow-up visit.        Thank you for choosing [facility]       Thank you for choosing [facility] for your care. Our goal is always to provide you with excellent care. Hearing back from our patients is one way we can continue to improve our services. Please take a few minutes to complete the written survey that you may receive in the mail after you visit with us. Thank you!        [employer] Information     [facility] lets you send messages to your doctor, view your test results, renew your prescriptions, schedule appointments and more. To sign up, go to [email or web address] . Click on \"Log in\" on the left side of the screen, which will take you to the Welcome page. Then click on \"Sign up Now\" on the right side of the page.     You will be asked to enter the access code listed below, as well as some personal information. Please follow the directions to create your username and password.     Your access code is: [email or web address]  Expires: 2018  4:50 PM     Your access code will  in 90 days. If you need help or a new code, please call your [facility] clinic or [phone].        Care EveryWhere ID     This is your Care EveryWhere ID. This could be used by other organizations to access your [facility] medical records  [id number]        Equal Access to Services     [facility] GILDA : Alexandra Liang, radha coello, nell camacho. So [facility] [phone].    ATENCIÓN: Si habla español, tiene a mendiola disposición servicios gratuitos de " asistencia lingüística. Katia al [phone].    We comply with applicable federal civil rights laws and [place] laws. We do not discriminate on the basis of race, color, national origin, age, disability, sex, sexual orientation, or gender identity.            After Visit Summary       This is your record. Keep this with you and show to your community pharmacist(s) and doctor(s) at your next visit.

## 2017-12-21 VITALS
OXYGEN SATURATION: 93 % | BODY MASS INDEX: 26.29 KG/M2 | HEART RATE: 84 BPM | SYSTOLIC BLOOD PRESSURE: 138 MMHG | DIASTOLIC BLOOD PRESSURE: 94 MMHG | HEIGHT: 64 IN | WEIGHT: 154 LBS | TEMPERATURE: 98.2 F | RESPIRATION RATE: 16 BRPM

## 2017-12-21 ASSESSMENT — ENCOUNTER SYMPTOMS
BLOOD IN STOOL: 0
FATIGUE: 1
CHILLS: 0
NAUSEA: 0
FEVER: 0
ABDOMINAL PAIN: 1
VOMITING: 0
DIAPHORESIS: 0

## 2017-12-21 NOTE — ED PROVIDER NOTES
"  History     Chief Complaint:  Abdominal Pain    HPI   Soledad Angel is a 72 year old female with a history of diverticulitis who presents to the emergency department today evaluation of lower bilateral abdominal pain. She reports feeling her stomach \"changing\" in the past week with more gas and bloating, which she does not usually get with her diverticulitis. Yesterday, she could feel an onset of pain and took milk of magnesia. This morning, she's had lower abdominal constant similar to her previous episodes of diverticulitis prompting her visit to the emergency department for further evaluation. At arrival, she rates her pain at 4/10 and worse with movement and associated malaise. She denies black or bloody stool, urinary symptoms, fever, body aches, chills, nausea, vomiting, sweats, and use of pain medications. Of note, her most recent bowel movement was this morning. Her most recent case of diverticulitis was July 2017 and Augmentin typically alleviates her diverticulitis.    Allergies:  Ciprofloxacin    Medications:    Aspirin PO  INULIN PO  lactobacillus TABS  fluticasone (FLOVENT DISKUS) 50 MCG/BLIST AEPB  LORAZEPAM PO    Past Medical History:    Diverticular disease    Past Surgical History:    Tonsillectomy    Family History:    History reviewed. No pertinent family history.     Social History:  The patient was alone.  Smoking Status: former  Smokeless Tobacco: never  Alcohol Use: Yes  Marital Status:  Single     Review of Systems   Constitutional: Positive for fatigue. Negative for chills, diaphoresis and fever.        Malaise   Gastrointestinal: Positive for abdominal pain. Negative for blood in stool, nausea and vomiting.   Genitourinary: Negative.    All other systems reviewed and are negative.    Physical Exam   First Vitals:  BP: (!) 158/93  Heart Rate: 104  Temp: 98.2  F (36.8  C)  Resp: 16  Height: 162.6 cm (5' 4\")  Weight: 69.9 kg (154 lb)  SpO2: 99 %    Physical Exam  SKIN:  Warm, dry.  "   HEMATOLOGIC/IMMUNOLOGIC/LYMPHATIC:  No pallor.  EYES:  Conjunctivae normal.  CARDIOVASCULAR:  Regular rate and rhythm.  RESPIRATORY:  No respiratory distress, breath sounds equal and normal.  GASTROINTESTINAL:  Soft tender abdomen at the LLQ with normal active bowel sounds.  Mild distension.  No abdominal mass.   MUSCULOSKELETAL:  ROM of the torso does not exacerbate the abdominal pain.  NEUROLOGIC:  Alert, conversant.  PSYCHIATRIC:  Normal mood.    Emergency Department Course     Emergency Department Course:  Nursing notes and vitals reviewed.  0002: I performed an exam of the patient as documented above.   Findings and plan explained to the Patient. Patient discharged home with instructions regarding supportive care, medications, and reasons to return. The importance of close follow-up was reviewed. The patient was prescribed Augmentin.  I personally answered all related questions with the patient prior to discharge.    Impression & Plan      Medical Decision Making:  Soledad Angel presents with sounds to be a recurrent bout of diverticulitis. A reassuring non-surgical abdomen on examination. She states this feels very similar if not identical to previous episodes of diverticulitis. She does not feel too ill for outpatient treatment. Given this presentation and examination findings and otherwise she is feeling systemically well. I did not think she required evaluation. I opted to treat her empirically for diverticulitis and I prescribed Augmentin as that has been effective in the past. I advised to start her Augmentin tonight and return here if feeling worse in the coming days.      Diagnosis:    ICD-10-CM    1. Abdominal pain, lower R10.30    2. Abdominal bloating R14.0      Disposition:  Discharged to home    Discharge Medications:      Details   amoxicillin-clavulanate (AUGMENTIN) 875-125 MG per tablet Take 1 tablet by mouth 2 times daily for 10 days, Disp-20 tablet, R-0, E-Prescribe          Scribe  Disclosure:  I, Yolie Jacome, am serving as a scribe at 12:02 AM on 12/21/2017 to document services personally performed by Kyle Mack MD based on my observations and the provider's statements to me.     12/20/2017    EMERGENCY DEPARTMENT       Kyle Mack MD  12/24/17 0902

## 2017-12-21 NOTE — ED NOTES
Patient presents to ED with c/o lower abdominal pain X 2 days. Pt reports excess flatulence for the past week. Pt denies any n/v/d. Pt afebrile. Pt reports hx of diverticulitis and reports this pain feels the same as diverticulitis. Pt reports she is supposed to have a colectomy next Friday d/t repeat episodes of diverticulitis. VSS at this time., will continue to monitor, call light in reach.

## 2017-12-21 NOTE — DISCHARGE INSTRUCTIONS
Probable Diverticulitis    Some people get pouches along the wall of the colon as they get older. The pouches, called diverticuli, usually cause no symptoms. If the pouches become blocked, you can get an infection. This infection is called diverticulitis. It causes pain in your lower abdomen and fever. If not treated, it can become a serious condition, causing an abscess to form inside the pouch. The abscess may block the intestinal tract even or rupture, spreading infection throughout the abdomen.  When treatment is started early, oral antibiotics alone may be enough to cure diverticulitis. This method is tried first. But, if you don't improve or if your condition gets worse while using oral antibiotics, you may need to be admitted to the hospital for IV antibiotics. Severe cases may require surgery.  Home care  The following guidelines will help you care for yourself at home:    During the acute illness, rest and follow your healthcare provider's instructions about diet. Sometimes you will need to follow a clear liquid diet to rest your bowel. Once your symptoms are better, you may be told to follow a low-fiber diet for some time. Include foods like:    Flake cereal, mashed potatoes, pancakes, waffles, pasta, white bread, rice, applesauce, bananas, eggs, fish, poultry, tofu, and cooked soft vegetables    Take antibiotics exactly as instructed. Don't miss any doses or stop taking the medication, even if you feel better.    Monitor your temperature and tell your healthcare provider if you have rising temperatures.  Preventing future attacks  Once you have an episode of diverticulitis, you are at risk for having it again. After you have recovered from this episode, you may be able to lower your risk by eating a high-fiber diet (20 gm/day to 35 gm/day of fiber). This cleans out the colon pouches that already exist and may prevent new ones from forming. Foods high in fiber include fresh fruits and edible peelings,  raw or lightly cooked vegetables, whole grain cereals and breads, dried beans and peas, and bran.  Other steps that can help prevent future attacks include:    Take your medicines, such as antibiotics, as your healthcare provider says.    Drink 6 to 8 glasses of water every day, unless told otherwise.    Use a heating pad or hot water bottle to help abdominal cramping or pain.    Begin an exercise program. Ask your healthcare provider how to get started. You can benefit from simple activities such as walking or gardening.    Treat diarrhea with a bland diet. Start with liquids only; then slowly add fiber over time.    Watch for changes in your bowel movements (constipation to diarrhea). Avoid constipation by eating a high fiber diet and taking a stool softener if needed.    Get plenty of rest and sleep.  Follow-up care  Follow up with your healthcare provider as advised or sooner if you are not getting better in the next 2 days.  When to seek medical advice  Call your healthcare provider right away if any of these occur:    Fever of 100.4 F (38 C) or higher, or as directed by your healthcare provider    Repeated vomiting or swelling of the abdomen    Weakness, dizziness, light-headedness    Pain in your abdomen that gets worse, severe, or spreads to your back    Pain that moves to the right lower abdomen    Rectal bleeding (stools that are red, black or maroon color)    Unexpected vaginal bleeding  Date Last Reviewed: 9/1/2016 2000-2017 [employer]. [address] [place] [id number]. All rights reserved. This information is not intended as a substitute for professional medical care. Always follow your healthcare professional's instructions.

## 2022-09-09 ENCOUNTER — HOSPITAL ENCOUNTER (OUTPATIENT)
Facility: CLINIC | Age: 77
Setting detail: OBSERVATION
Discharge: HOME | End: 2022-09-10
Attending: EMERGENCY MEDICINE | Admitting: INTERNAL MEDICINE
Payer: MEDICARE

## 2022-09-09 ENCOUNTER — APPOINTMENT (OUTPATIENT)
Dept: CT IMAGING | Facility: CLINIC | Age: 77
End: 2022-09-09
Attending: EMERGENCY MEDICINE
Payer: MEDICARE

## 2022-09-09 ENCOUNTER — APPOINTMENT (OUTPATIENT)
Dept: ULTRASOUND IMAGING | Facility: CLINIC | Age: 77
End: 2022-09-09
Attending: EMERGENCY MEDICINE
Payer: MEDICARE

## 2022-09-09 DIAGNOSIS — R06.03 ACUTE RESPIRATORY DISTRESS: ICD-10-CM

## 2022-09-09 DIAGNOSIS — I26.94 MULTIPLE SUBSEGMENTAL PULMONARY EMBOLI WITHOUT ACUTE COR PULMONALE (H): ICD-10-CM

## 2022-09-09 LAB
ANION GAP SERPL CALCULATED.3IONS-SCNC: 7 MMOL/L (ref 3–14)
ATRIAL RATE - MUSE: 109 BPM
BASOPHILS # BLD AUTO: 0.1 10E3/UL (ref 0–0.2)
BASOPHILS NFR BLD AUTO: 1 %
BUN SERPL-MCNC: 13 MG/DL (ref 7–30)
CALCIUM SERPL-MCNC: 9.1 MG/DL (ref 8.5–10.1)
CHLORIDE BLD-SCNC: 107 MMOL/L (ref 94–109)
CO2 SERPL-SCNC: 24 MMOL/L (ref 20–32)
CREAT SERPL-MCNC: 0.54 MG/DL (ref 0.52–1.04)
D DIMER PPP FEU-MCNC: 3.26 UG/ML FEU (ref 0–0.5)
DIASTOLIC BLOOD PRESSURE - MUSE: NORMAL MMHG
EOSINOPHIL # BLD AUTO: 0.3 10E3/UL (ref 0–0.7)
EOSINOPHIL NFR BLD AUTO: 4 %
ERYTHROCYTE [DISTWIDTH] IN BLOOD BY AUTOMATED COUNT: 12.8 % (ref 10–15)
GFR SERPL CREATININE-BSD FRML MDRD: >90 ML/MIN/1.73M2
GLUCOSE BLD-MCNC: 126 MG/DL (ref 70–99)
HCT VFR BLD AUTO: 45.6 % (ref 35–47)
HGB BLD-MCNC: 14.9 G/DL (ref 11.7–15.7)
HOLD SPECIMEN: NORMAL
HOLD SPECIMEN: NORMAL
IMM GRANULOCYTES # BLD: 0 10E3/UL
IMM GRANULOCYTES NFR BLD: 0 %
INTERPRETATION ECG - MUSE: NORMAL
LYMPHOCYTES # BLD AUTO: 2.3 10E3/UL (ref 0.8–5.3)
LYMPHOCYTES NFR BLD AUTO: 33 %
MCH RBC QN AUTO: 30.8 PG (ref 26.5–33)
MCHC RBC AUTO-ENTMCNC: 32.7 G/DL (ref 31.5–36.5)
MCV RBC AUTO: 94 FL (ref 78–100)
MONOCYTES # BLD AUTO: 0.5 10E3/UL (ref 0–1.3)
MONOCYTES NFR BLD AUTO: 7 %
NEUTROPHILS # BLD AUTO: 3.8 10E3/UL (ref 1.6–8.3)
NEUTROPHILS NFR BLD AUTO: 55 %
NRBC # BLD AUTO: 0 10E3/UL
NRBC BLD AUTO-RTO: 0 /100
NT-PROBNP SERPL-MCNC: 160 PG/ML (ref 0–900)
P AXIS - MUSE: 21 DEGREES
PLATELET # BLD AUTO: 249 10E3/UL (ref 150–450)
POTASSIUM BLD-SCNC: 3.8 MMOL/L (ref 3.4–5.3)
PR INTERVAL - MUSE: 154 MS
QRS DURATION - MUSE: 108 MS
QT - MUSE: 342 MS
QTC - MUSE: 460 MS
R AXIS - MUSE: -32 DEGREES
RBC # BLD AUTO: 4.83 10E6/UL (ref 3.8–5.2)
SARS-COV-2 RNA RESP QL NAA+PROBE: NEGATIVE
SODIUM SERPL-SCNC: 138 MMOL/L (ref 133–144)
SYSTOLIC BLOOD PRESSURE - MUSE: NORMAL MMHG
T AXIS - MUSE: -7 DEGREES
TROPONIN I SERPL HS-MCNC: 44 NG/L
UFH PPP CHRO-ACNC: >1.1 IU/ML
VENTRICULAR RATE- MUSE: 109 BPM
WBC # BLD AUTO: 6.9 10E3/UL (ref 4–11)

## 2022-09-09 PROCEDURE — G0378 HOSPITAL OBSERVATION PER HR: HCPCS

## 2022-09-09 PROCEDURE — U0003 INFECTIOUS AGENT DETECTION BY NUCLEIC ACID (DNA OR RNA); SEVERE ACUTE RESPIRATORY SYNDROME CORONAVIRUS 2 (SARS-COV-2) (CORONAVIRUS DISEASE [COVID-19]), AMPLIFIED PROBE TECHNIQUE, MAKING USE OF HIGH THROUGHPUT TECHNOLOGIES AS DESCRIBED BY CMS-2020-01-R: HCPCS | Performed by: EMERGENCY MEDICINE

## 2022-09-09 PROCEDURE — 85520 HEPARIN ASSAY: CPT | Performed by: INTERNAL MEDICINE

## 2022-09-09 PROCEDURE — 96376 TX/PRO/DX INJ SAME DRUG ADON: CPT | Mod: 59

## 2022-09-09 PROCEDURE — 93005 ELECTROCARDIOGRAM TRACING: CPT

## 2022-09-09 PROCEDURE — 250N000011 HC RX IP 250 OP 636: Performed by: EMERGENCY MEDICINE

## 2022-09-09 PROCEDURE — 250N000013 HC RX MED GY IP 250 OP 250 PS 637: Performed by: INTERNAL MEDICINE

## 2022-09-09 PROCEDURE — 80048 BASIC METABOLIC PNL TOTAL CA: CPT | Performed by: EMERGENCY MEDICINE

## 2022-09-09 PROCEDURE — 36415 COLL VENOUS BLD VENIPUNCTURE: CPT | Performed by: EMERGENCY MEDICINE

## 2022-09-09 PROCEDURE — 84484 ASSAY OF TROPONIN QUANT: CPT | Performed by: EMERGENCY MEDICINE

## 2022-09-09 PROCEDURE — 96366 THER/PROPH/DIAG IV INF ADDON: CPT

## 2022-09-09 PROCEDURE — 96365 THER/PROPH/DIAG IV INF INIT: CPT | Mod: 59

## 2022-09-09 PROCEDURE — 93971 EXTREMITY STUDY: CPT | Mod: RT

## 2022-09-09 PROCEDURE — 85025 COMPLETE CBC W/AUTO DIFF WBC: CPT | Performed by: EMERGENCY MEDICINE

## 2022-09-09 PROCEDURE — 250N000009 HC RX 250: Performed by: EMERGENCY MEDICINE

## 2022-09-09 PROCEDURE — 85379 FIBRIN DEGRADATION QUANT: CPT | Performed by: EMERGENCY MEDICINE

## 2022-09-09 PROCEDURE — 71275 CT ANGIOGRAPHY CHEST: CPT | Mod: MA

## 2022-09-09 PROCEDURE — 250N000011 HC RX IP 250 OP 636: Performed by: INTERNAL MEDICINE

## 2022-09-09 PROCEDURE — 99220 PR INITIAL OBSERVATION CARE,LEVEL III: CPT | Performed by: INTERNAL MEDICINE

## 2022-09-09 PROCEDURE — 99285 EMERGENCY DEPT VISIT HI MDM: CPT | Mod: 25

## 2022-09-09 PROCEDURE — 83880 ASSAY OF NATRIURETIC PEPTIDE: CPT | Performed by: EMERGENCY MEDICINE

## 2022-09-09 PROCEDURE — 36415 COLL VENOUS BLD VENIPUNCTURE: CPT | Performed by: INTERNAL MEDICINE

## 2022-09-09 PROCEDURE — 93970 EXTREMITY STUDY: CPT

## 2022-09-09 PROCEDURE — C9803 HOPD COVID-19 SPEC COLLECT: HCPCS

## 2022-09-09 RX ORDER — AMOXICILLIN 250 MG
1 CAPSULE ORAL 2 TIMES DAILY PRN
Status: DISCONTINUED | OUTPATIENT
Start: 2022-09-09 | End: 2022-09-10 | Stop reason: HOSPADM

## 2022-09-09 RX ORDER — BISACODYL 10 MG
10 SUPPOSITORY, RECTAL RECTAL DAILY PRN
Status: DISCONTINUED | OUTPATIENT
Start: 2022-09-09 | End: 2022-09-10 | Stop reason: HOSPADM

## 2022-09-09 RX ORDER — HEPARIN SODIUM 10000 [USP'U]/100ML
0-5000 INJECTION, SOLUTION INTRAVENOUS CONTINUOUS
Status: DISCONTINUED | OUTPATIENT
Start: 2022-09-09 | End: 2022-09-10

## 2022-09-09 RX ORDER — POLYETHYLENE GLYCOL 3350 17 G/17G
17 POWDER, FOR SOLUTION ORAL DAILY PRN
Status: DISCONTINUED | OUTPATIENT
Start: 2022-09-09 | End: 2022-09-10 | Stop reason: HOSPADM

## 2022-09-09 RX ORDER — HEPARIN SODIUM 10000 [USP'U]/100ML
0-5000 INJECTION, SOLUTION INTRAVENOUS CONTINUOUS
Status: DISCONTINUED | OUTPATIENT
Start: 2022-09-09 | End: 2022-09-09

## 2022-09-09 RX ORDER — ACETAMINOPHEN 325 MG/1
650 TABLET ORAL EVERY 4 HOURS PRN
Status: DISCONTINUED | OUTPATIENT
Start: 2022-09-09 | End: 2022-09-10 | Stop reason: HOSPADM

## 2022-09-09 RX ORDER — IOPAMIDOL 755 MG/ML
63 INJECTION, SOLUTION INTRAVASCULAR ONCE
Status: COMPLETED | OUTPATIENT
Start: 2022-09-09 | End: 2022-09-09

## 2022-09-09 RX ORDER — ASPIRIN 81 MG/1
81 TABLET ORAL EVERY OTHER DAY
Status: ON HOLD | COMMUNITY
End: 2022-09-10

## 2022-09-09 RX ORDER — AMOXICILLIN 250 MG
2 CAPSULE ORAL 2 TIMES DAILY PRN
Status: DISCONTINUED | OUTPATIENT
Start: 2022-09-09 | End: 2022-09-10 | Stop reason: HOSPADM

## 2022-09-09 RX ORDER — LIDOCAINE 40 MG/G
CREAM TOPICAL
Status: DISCONTINUED | OUTPATIENT
Start: 2022-09-09 | End: 2022-09-10 | Stop reason: HOSPADM

## 2022-09-09 RX ORDER — ONDANSETRON 2 MG/ML
4 INJECTION INTRAMUSCULAR; INTRAVENOUS EVERY 6 HOURS PRN
Status: DISCONTINUED | OUTPATIENT
Start: 2022-09-09 | End: 2022-09-10 | Stop reason: HOSPADM

## 2022-09-09 RX ORDER — LORATADINE 10 MG/1
10 TABLET ORAL DAILY
COMMUNITY

## 2022-09-09 RX ORDER — ACETAMINOPHEN 650 MG/1
650 SUPPOSITORY RECTAL EVERY 4 HOURS PRN
Status: DISCONTINUED | OUTPATIENT
Start: 2022-09-09 | End: 2022-09-10 | Stop reason: HOSPADM

## 2022-09-09 RX ORDER — FLUTICASONE PROPIONATE 50 MCG
1 SPRAY, SUSPENSION (ML) NASAL DAILY
COMMUNITY

## 2022-09-09 RX ORDER — ONDANSETRON 4 MG/1
4 TABLET, ORALLY DISINTEGRATING ORAL EVERY 6 HOURS PRN
Status: DISCONTINUED | OUTPATIENT
Start: 2022-09-09 | End: 2022-09-10 | Stop reason: HOSPADM

## 2022-09-09 RX ADMIN — SODIUM CHLORIDE 88 ML: 900 INJECTION INTRAVENOUS at 10:57

## 2022-09-09 RX ADMIN — HEPARIN SODIUM 1000 UNITS/HR: 10000 INJECTION, SOLUTION INTRAVENOUS at 23:42

## 2022-09-09 RX ADMIN — ACETAMINOPHEN 650 MG: 325 TABLET ORAL at 17:01

## 2022-09-09 RX ADMIN — HEPARIN SODIUM 850 UNITS/HR: 10000 INJECTION, SOLUTION INTRAVENOUS at 11:56

## 2022-09-09 RX ADMIN — HEPARIN SODIUM 1300 UNITS/HR: 10000 INJECTION, SOLUTION INTRAVENOUS at 14:39

## 2022-09-09 RX ADMIN — IOPAMIDOL 63 ML: 755 INJECTION, SOLUTION INTRAVENOUS at 10:57

## 2022-09-09 ASSESSMENT — ENCOUNTER SYMPTOMS
NAUSEA: 0
FEVER: 0
DIAPHORESIS: 1
SHORTNESS OF BREATH: 1
LIGHT-HEADEDNESS: 1
CHEST TIGHTNESS: 0

## 2022-09-09 ASSESSMENT — ACTIVITIES OF DAILY LIVING (ADL)
ADLS_ACUITY_SCORE: 35
ADLS_ACUITY_SCORE: 31
ADLS_ACUITY_SCORE: 35
ADLS_ACUITY_SCORE: 31
ADLS_ACUITY_SCORE: 35

## 2022-09-09 NOTE — ED PROVIDER NOTES
History   Chief Complaint:  Shortness of Breath     HPI   Soledad Angel is a 77 year old female with history of hyperlipidemia, hypertension and PVC who presents with shortness of breath. At 0830 this morning, the patient went on her regular walk when she developed persistent shortness of breath with accompanying diaphoresis and mild light headedness. Additionally, she has experienced right shoulder pain and left calf pain. She notes, she occasionally becomes short of breath during the start of her walks due to PVCs, but her shortness of breath due to this is not normally persistent. Due to her continued shortness of breath she presented here. She denies any chest tightness, chest pain, leg swelling, hemoptysis, fever or nausea. She was seen recently at her cardiology clinic where she received a stress test and MRI. She states her stress test showed an increased rate of PVCs compared to prior. Of note, she does not have a history of blood clots or cancer. She does not smoke and is not on any estrogen treatments. Her father had a MI in his 60s. She has not recently been tested for Covid-19.     Imaging results from 06/16/2022  MR Cardiac WWO  1.   Normal left ventricular systolic function, LVEF 64%.       A.   Normal LV volumes and mass.  2.   Normal right ventricular systolic function, RVEF 63%.       A.   Normal RV volumes and wall motion.  3.   Small amount of replacement mid myocardial fibrosis in the basilar anteroseptum and anterolateral  wall.  4.   No infiltrative cardiomyopathy with a normal ECV 21%.  5.   Normal atria.  6.   Normal pericardium.  7.   Normal lungs.  8.   Normal aorta.    Review of Systems   Constitutional: Positive for diaphoresis. Negative for fever.   HENT:        No hemoptysis   Respiratory: Positive for shortness of breath. Negative for chest tightness.    Cardiovascular: Negative for chest pain and leg swelling.   Gastrointestinal: Negative for nausea.   Musculoskeletal:         "Right shoulder pain  Left calf pain   Neurological: Positive for light-headedness.   All other systems reviewed and are negative.    Allergies:  Ciprofloxacin    Medications:  Vitamin D3  Flovent  Claritin  Lorazepam     Past Medical History:    Diverticular disease  Vasomotor rhinitis   NOEL  Dysphagia  Hyperlipidemia  Disorder of bone and cartilage  PVC  Urinary incontinence   Hypertension     Past Surgical History:    Tonsillectomy   Cataract removal  ORIF extra-articular radial fracture, left  Repair hernia ventral/incisional  Sigmoid colectomy    Family History:    Father: MI  Mother: Breast cancer    Social History:  The patient was unaccompanied to the ED.    Physical Exam     Patient Vitals for the past 24 hrs:   BP Temp Temp src Pulse Resp SpO2 Height Weight   09/09/22 1433 100/61 -- -- 52 -- 93 % -- --   09/09/22 1420 (!) 145/96 98.2  F (36.8  C) Oral 95 20 95 % -- --   09/09/22 1330 (!) 154/95 -- -- 92 21 -- -- --   09/09/22 1200 (!) 156/107 -- -- 101 17 91 % -- --   09/09/22 1130 (!) 172/111 -- -- 94 26 96 % -- --   09/09/22 1115 (!) 155/109 -- -- 92 (!) 55 96 % -- --   09/09/22 1030 (!) 145/132 -- -- 95 28 96 % -- --   09/09/22 1000 (!) 170/112 -- -- 105 30 96 % -- --   09/09/22 0925 (!) 174/94 97.4  F (36.3  C) Temporal 115 16 93 % 1.626 m (5' 4\") 72.6 kg (160 lb)       Physical Exam  General: Well-nourished, appears anxious  Eyes: PERRL, conjunctivae pink no scleral icterus or conjunctival injection  ENT:  Moist mucus membranes, posterior oropharynx clear without erythema or exudates  Respiratory:  Lungs clear to auscultation bilaterally, no crackles/rubs/wheezes.  Good air movement. Tachypnea and appears winded with speaking  CV: Tachycardic rate and rhythm, no murmurs/rubs/gallops  GI:  Abdomen soft and non-distended.  Normoactive BS.  No tenderness, guarding or rebound  Skin: Warm, dry.  No rashes or petechiae  Musculoskeletal: No peripheral edema or calf tenderness  Neuro: Alert and oriented to " person/place/time  Psychiatric: Normal affect      Emergency Department Course   ECG:  ECG taken at 0933, ECG read at 0933  Sinus tachycardia  Possible Left atrial enlargement  Left axis deviation  Incomplete right bundle branch block  ST & T wave abnormality, consider anterior ischemia   Abnormal ECG  Rate 109 bpm. ID interval 154 ms. QRS duration 108 ms. QT/QTc 342/460 ms. P-R-T axes 21 -32 -7.    Imaging:  US Lower Extremity Venous Duplex Bilateral   Final Result   IMPRESSION:   1. Positive for DVT in one of two left peroneal veins.   2. Negative for DVT in the right lower extremity.       MESERET HOANG MD            SYSTEM ID:  [id number]      US Upper Extremity Venous Duplex Right   Final Result   IMPRESSION: Negative for DVT in the right upper extremity.       MESERET HOANG MD            SYSTEM ID:  [id number]      CT Chest Pulmonary Embolism w Contrast   Final Result   IMPRESSION:   1.  Positive for multiple bilateral segmental pulmonary emboli. No CT   evidence for right heart strain.   2.  A filling defect in the right internal jugular vein may be due to   mixing artifact or thrombus. Consider right upper extremity venous   Doppler ultrasound.   3.  Left upper lobe 5 mm nodule. See follow-up guidance.   4.  Few small mediastinal and hilar lymph nodes are likely reactive.      Recommendations for one or multiple incidental lung nodules < 6mm :     Low risk patients: No routine follow-up.     High risk patients: Optional follow-up CT at 12 months; if   unchanged, no further follow-up.      *Low Risk: Minimal or absent history of smoking or other known risk   factors.   *Nonsolid (ground glass) or partly solid nodules may require longer   follow-up to exclude indolent adenocarcinoma.   *Recommendations based on Guidelines for the Management of Incidental   Pulmonary Nodules Detected at CT: From the Fleischner Society 2017,   Radiology 2017.      Findings were discussed with Dr. Baker at 11:20 AM.      PRATIBHA MCKINNEY  MD SEEMA            SYSTEM ID:  [id number]          Laboratory:  Labs Ordered and Resulted from Time of ED Arrival to Time of ED Departure   BASIC METABOLIC PANEL - Abnormal       Result Value    Sodium 138      Potassium 3.8      Chloride 107      Carbon Dioxide (CO2) 24      Anion Gap 7      Urea Nitrogen 13      Creatinine 0.54      Calcium 9.1      Glucose 126 (*)     GFR Estimate >90     D DIMER QUANTITATIVE - Abnormal    D-Dimer Quantitative 3.26 (*)    NT PROBNP INPATIENT - Normal    N terminal Pro BNP Inpatient 160     TROPONIN I - Normal    Troponin I High Sensitivity 44     COVID-19 VIRUS (CORONAVIRUS) BY PCR - Normal    SARS CoV2 PCR Negative     CBC WITH PLATELETS AND DIFFERENTIAL    WBC Count 6.9      RBC Count 4.83      Hemoglobin 14.9      Hematocrit 45.6      MCV 94      MCH 30.8      MCHC 32.7      RDW 12.8      Platelet Count 249      % Neutrophils 55      % Lymphocytes 33      % Monocytes 7      % Eosinophils 4      % Basophils 1      % Immature Granulocytes 0      NRBCs per 100 WBC 0      Absolute Neutrophils 3.8      Absolute Lymphocytes 2.3      Absolute Monocytes 0.5      Absolute Eosinophils 0.3      Absolute Basophils 0.1      Absolute Immature Granulocytes 0.0      Absolute NRBCs 0.0        Emergency Department Course:     Reviewed:  I reviewed nursing notes, vitals, past medical history and care everywhere    Assessments:  0953 I obtained history and examined the patient as noted above.     1135 I rechecked and updated the patient regarding the imaging results, laboratory results and the plan for care.    Consults:   1151 I spoke with the hospitalist, Dr. Kimbrough, regarding placement for the patient.    Interventions:  1155 Heparin 4,350 Units IV  1156 Heparin 850 Units/hr IV    Disposition:  The patient was admitted to the hospital under the care of Dr. Kimbrough.     Impression & Plan     PESI Score for estimating PE Associated 30-Day Mortality (calculator)  Background  Estimates the 30-day  mortality risk associated with pulmonary embolism based on 11 criteria including age, gender, cancer history, CHF, COPD, heart rate >110, SBP <100, RR >30, Tm <96.8 F, O2 Sat <90, and altered mental status.   Data  77 year old  has Vasomotor rhinitis; Urinary incontinence; Palpitations; Musculoskeletal pain; Macular pucker; Disorder of bone and cartilage; Labile blood pressure; Impaired fasting glucose; Imbalance; Personal history of tobacco use, presenting hazards to health; Family history of other endocrine and metabolic diseases(V18.19); Elevated cholesterol; Edema; Dysphagia; Diverticulitis of colon; Generalized anxiety disorder; and Abnormal CT scan on their problem list.  Temp: 97.4  F (36.3  C)  BP: (!) 145/132  Pulse: 95  Resp: 28  SpO2: 96 %  Criteria  NEGATIVE  Score 1/y: Age  Score 20: Heart rate >110 bpm  Score 20: Respiratory rate >30 bpm  Interpretation  PESI Score: 115  Score <126: Class 4 - High 30-day mortality risk (4.0 to 11.4%)    Medical Decision Making:  Soledad Angel is a 77 year old female who comes with sudden onset dyspnea as well as some right shoulder discomfort.  We considered a broad differential.  EKG has some ischemic changes but does not meet criteria for NSTEMI.  Concerning way, she was tachycardic.  Initial troponin was negative as well as a BNP.  Electrolytes and hemoglobin were also reassuring.  D-dimer, however, was significantly elevated.  CT scan subsequently showed multiple subsegmental pulmonary emboli.  Her PAC score is 115 which places her in class IV with a 4 to 11% 30-day mortality risk.  We started her on heparin.  We will admit her to the hospital for monitoring overnight to ensure hemodynamic stability and obtain Doppler ultrasounds to identify the source and further medical work-up to identify why she developed a pulmonary embolism without any apparent provocation.  Dr. Maier graciously agreed admit the patient.  The patient was in agreement the plan as  well.    Diagnosis:    ICD-10-CM    1. Multiple subsegmental pulmonary emboli without acute cor pulmonale (H)  I26.94    2. Acute respiratory distress  R06.03        Scribe Disclosure:  I, Lio Mccoy, am serving as a scribe at 9:52 AM on 9/9/2022 to document services personally performed by Hattie Baker MD based on my observations and the provider's statements to me.      Hattie Baker MD  09/09/22 1431

## 2022-09-09 NOTE — PROGRESS NOTES
Orientation/Cognitive: A/OX4.  Observation Goals (Met/ Not Met): Not met  Mobility Level/Assist Equipment: SBA  Fall Risk (Y/N): Y  Behavior Concerns: None  Pain Management: Tylenol w/ relief.  Tele/VS/O2: Slightly elevated BP, other VSS on RA. Tele: SR w/ BBB.  ABNL Lab/BG: D-dimer 3.26, . CT chest w/ PE's. US lower extremities.   Diet: Reg  Bowel/Bladder: Continent. BM today.  Skin Concerns: WNL  Drains/Devices: PIV infusing heparin gtt @ 1,300 units/hr.   Tests/Procedures for next shift: Next 10a @ 2045hrs.   Anticipated DC date & active delays: 9/10 , pending improvement.   Patient Stated Goal for Today: Rest.

## 2022-09-09 NOTE — PHARMACY-ADMISSION MEDICATION HISTORY
Pharmacy Medication History  Admission medication history interview status for the 9/9/2022  admission is complete. See EPIC admission navigator for prior to admission medications     Location of Interview: Patient room  Medication history sources: Patient and Surescripts    Significant changes made to the medication list:  Changed lorazepam to 0.25 mg as needed (was 0.5 mg daily as needed)     In the past week, patient estimated taking medication this percent of the time: greater than 90%    Additional medication history information:   Patient took one full strength aspirin this morning, but she does not take the full strength regularly.     Medication reconciliation completed by provider prior to medication history? No    Time spent in this activity: 10 minutes    Prior to Admission medications    Medication Sig Last Dose Taking? Auth Provider Long Term End Date   aspirin 81 MG EC tablet Take 81 mg by mouth every other day (4 days per week) 9/8/2022 at Unknown time Yes Unknown, Entered By History     Cholecalciferol (VITAMIN D-3 PO) Take 1 tablet by mouth daily 9/8/2022 at Unknown time Yes Reported, Patient     fish oil-omega-3 fatty acids 1000 MG capsule Take 2 g by mouth daily 9/8/2022 at Unknown time Yes Reported, Patient     fluticasone (FLONASE) 50 MCG/ACT nasal spray Spray 1 spray into both nostrils daily 9/8/2022 at Unknown time Yes Reported, Patient     loratadine (CLARITIN) 10 MG tablet Take 10 mg by mouth daily 9/8/2022 at Unknown time Yes Reported, Patient     LORazepam (ATIVAN) 0.5 MG tablet Take 0.25 mg by mouth nightly as needed (sleep)  Yes Reported, Patient         The information provided in this note is only as accurate as the sources available at the time of update(s)

## 2022-09-09 NOTE — PROGRESS NOTES
Observation goals  PRIOR TO DISCHARGE       Comments:   -diagnostic tests and consults completed and resulted: Partially met    -vital signs normal or at patient baseline: Met    -dyspnea improved and O2 sats greater than 88% on room air or prior home oxygen levels : Met    -ultrasounds complete: Met    -transitioned to oral or SQ anticoagulation with pharmacy verifying discharge plan : Not met

## 2022-09-09 NOTE — H&P
[facility]    History and Physical - Hospitalist Service       Date of Admission:  9/9/2022    Assessment & Plan      Soledad Angel is a 77 year old female with history of hyperlipidemia on fish oil, hypertension not on treatment, anxiety, non-seasonal allergic rhinitis and PVC's who presents with shortness of breath and was found to have bilateral pulmonary embolus.    Bilateral pulmonary embolus. CT scan showed multiple bilateral segmental pulmonary emboli without heart strain, a filling defect in the right internal jugular vein that may be due to mixing artifact or thrombus with recommendation to get right upper extremity venous doppler, a left upper lobe 5 mm nodule that will need follow up and a few small mediastinal and hilar lymph nodes that are likely reactive. EKG showed sinus tachycardia with ST depressions in V2 and V3 with no comparison and no symptoms of ischemia in setting of normal troponin and BNP.  -[place] to observation  -continue heparin drip and change to Eliquis, Rivaroxaban or Lovenox tomorrow once ultrasound shows no mobile masses that would require interventional radiology procedure and pharmacy determines insurance formulary/authorization (would recommend Eliquis if reasonable price compared to other options), plan for 3 months of treatment and then would consider repeat d-dimer and discussion about continuing or not based on risks/benefits  -discontinue home aspirin that she takes for primary prevention while on stronger blood thinners  -No echo indicated with normal BNP and troponin and no evidence of right heart strain  -Follow-up with primary care physician to consider additional cancer screening work-up in the setting of new unprovoked pulmonary embolus but patient had recent colonoscopy last fall and normal mammograms with no signs of symptoms to suggest cancer, small pulmonary nodule was noticed on CT scan and patient did note a previous lung nodule in  "her lower lungs that was stable on repeat imaging a long time ago so this one seems new - would recommend repeat CT in 6 months    Benign essential hypertension.  Not on medications at home.  -Suspect acute hypertension is reactive from her PE and can follow up as outpatient to see if this returns to normal or if she would benefit from dietary modifications and medications as an outpatient    Chronic PVC's.  -chronic and stable with minimal symptoms, no additional workup indicated    Insomnia  -hold PRN ativan while in the hospital to avoid confusion, can resume at home but should discuss with PCP if there is a better alternative for her insomnia like melatonin or an insomnia specific medication    Mixed hyperlipidemia  -hold home fish oil and can resume at discharge, not on anything stronger because of medication intolerance    Allergic rhinitis  -Hold home Claritin and flonase and can resume at discharge    Hyperglycemia  -Mild hyperglycemia noticed on admission and can follow-up with her primary care physician for consideration of A1c and diabetic screening    Incidental pulmonary nodule  -follow up with PCP and would recommend repeat CT in 6 months instead of the standard 12 month interval in setting of unprovoked PE     Diet:  Regular diet  DVT Prophylaxis: Heparin drip  Lundberg Catheter: Not present  Central Lines: None  Cardiac Monitoring: None  Code Status:  Full code    Clinically Significant Risk Factors Present on Admission                # Platelet Defect: home medication list includes an antiplatelet medication     # Overweight: Estimated body mass index is 27.46 kg/m  as calculated from the following:    Height as of this encounter: 1.626 m (5' 4\").    Weight as of this encounter: 72.6 kg (160 lb).        Disposition Plan      Expected Discharge Date: 09/10/2022,  3:00 PM              The patient's care was discussed with the Patient.    Ilia Kimbrough MD  Hospitalist Service  [facility] " [facility]  Securely message with the [facility] Web Console (learn more here)  Text page via [facility] Paging/Directory   ______________________________________________________________________    Chief Complaint   Shortness of breath    History is obtained from the patient, electronic health record and emergency department physician    History of Present Illness   Soledad Angel is a 77 year old female with history of hyperlipidemia on fish oil, hypertension not on treatment, anxiety, non-seasonal allergic rhinitis and PVC's who presents with shortness of breath. At 0830 this morning, the patient went on her regular walk when she developed persistent shortness of breath with accompanying diaphoresis and mild light headedness. Additionally, she has experienced right shoulder pain and left calf pain. She notes, she occassionally becomes short of breath during the start of her walks due to PVCs, but her shortness of breath due to this is not normally persistent. Due to her continued shortness of breath she presented here. She denies any chest tightness, chest pain, leg swelling, hemoptysis, fever or nausea. She was seen recently at her cardiology clinic where she received a stress test and MRI. She states her stress test showed an increased rate of PVCs compared to prior. Of note, she does not have a personal history of blood clots or cancer. Her mom had breast cancer. No family history of clots. She does not smoke and is not on any estrogen treatments. She denies any recent travel, recent surgery or prolonged immobility. Her father had a MI in his 60s.  She is up-to-date on her cancer screening with a negative colonoscopy last fall, normal mammograms, no history of cervical cancers, and no new signs or symptoms.  She specifically denies any black or bloody stools.  She denies any lumps or bumps.    Evaluation in the emergency department showed an elevated D-dimer and CT scan showed multiple bilateral segmental  pulmonary emboli without heart strain, a filling defect in the right internal jugular vein that may be due to mixing artifact or thrombus with recommendation to get right upper extremity venous doppler, a left upper lobe 5 mm nodule that will need follow up and a few small mediastinal and hilar lymph nodes that are likely reactive.     Review of Systems    The 10 point Review of Systems is negative other than noted in the HPI or here. Left leg pain and left shoulder pain.  No chest pain. No bleeding or black stools. No lumps or masses.    Past Medical History    I have reviewed this patient's medical history and updated it with pertinent information if needed.   Past Medical History:   Diagnosis Date     Diverticular disease      Insomnia      Mixed hyperlipidemia      Non-seasonal allergic rhinitis      PVC's (premature ventricular contractions)      Past Surgical History   I have reviewed this patient's surgical history and updated it with pertinent information if needed.  Past Surgical History:   Procedure Laterality Date     COLECTOMY PARTIAL       of colon removed for diverticulitis     TONSILLECTOMY       Social History   I have reviewed this patient's social history and updated it with pertinent information if needed.  Social History     Tobacco Use     Smoking status: Former Smoker     Types: Cigarettes     Quit date: 5/15/1999     Years since quittin.3     Smokeless tobacco: Never Used   Substance Use Topics     Alcohol use: Yes     Comment: Rarely     Drug use: No       Family History   I have reviewed this patient's family history and updated it with pertinent information if needed.  Family History   Problem Relation Age of Onset     Hypertension Mother      Hyperlipidemia Mother      Breast Cancer Mother          from metastatic disease     Multiple Sclerosis Father      Myocardial Infarction Father          in late 60's of MI     Prior to Admission Medications   Prior to Admission  Medications   Prescriptions Last Dose Informant Patient Reported? Taking?   ASPIRIN PO 9/9/2022 at Unknown time  Yes Yes   Sig: Take 81 mg by mouth every other day    Cholecalciferol (VITAMIN D-3 PO)   Yes Yes   LORAZEPAM PO   Yes Yes   Sig: Take 0.5 mg by mouth   fish oil-omega-3 fatty acids 1000 MG capsule   Yes Yes   Sig: Take 2 g by mouth daily   fluticasone (FLONASE) 50 MCG/ACT nasal spray   Yes Yes   Sig: Spray 1 spray into both nostrils daily   loratadine (CLARITIN) 10 MG tablet   Yes Yes   Sig: Take 10 mg by mouth daily      Facility-Administered Medications: None     Allergies   Allergies   Allergen Reactions     Ciprofloxacin      Physical Exam   Vital Signs: Temp: 97.4  F (36.3  C) Temp src: Temporal BP: (!) 156/107 Pulse: 101   Resp: 17 SpO2: 91 % O2 Device: None (Room air)    Weight: 160 lbs 0 oz  Constitutional: Awake, alert, cooperative, no apparent distress.  Eyes: Conjunctiva and pupils examined and normal.  HEENT: Moist mucous membranes, normal dentition.  Respiratory: Clear to auscultation bilaterally, no crackles or wheezing.  Cardiovascular: Regular rate and rhythm, normal S1 and S2, and no murmur noted.  GI: Soft, non-distended, non-tender, normal bowel sounds.  Lymph/Hematologic: No anterior cervical or supraclavicular adenopathy.  Skin: No rashes, no cyanosis, no edema.  Musculoskeletal: No joint swelling, erythema or tenderness.  Neurologic: Cranial nerves 2-12 intact, normal strength and sensation.  Psychiatric: Alert, oriented to person, place and time, no obvious anxiety or depression.    Data   Data reviewed today: I reviewed all medications, new labs and imaging results over the last 24 hours. I personally reviewed the EKG tracing showing sinus tachycardia with ST depressions in V2 and V3 with no comparison and no symptoms of ischemia with negative troponin.    Recent Labs   Lab 09/09/22  0940   WBC 6.9   HGB 14.9   MCV 94         POTASSIUM 3.8   CHLORIDE 107   CO2 24   BUN  13   CR 0.54   ANIONGAP 7   AP 9.1   *     Recent Results (from the past 24 hour(s))   CT Chest Pulmonary Embolism w Contrast    Narrative    CT CHEST PULMONARY EMBOLISM W CONTRAST 9/9/2022 11:10 AM    CLINICAL HISTORY: Shortness of breath, tachycardia, elevated D-dimer  TECHNIQUE: CT angiogram chest during arterial phase injection IV  contrast. 2D and 3D MIP reconstructions were performed by the CT  technologist. Dose reduction techniques were used.     CONTRAST: 63 mL Isovue-370    COMPARISON: None.    FINDINGS:  ANGIOGRAM CHEST: Filling defects in multiple bilateral segmental  pulmonary arteries consistent with bilateral pulmonary emboli.  Thoracic aorta is negative for dissection. No CT evidence of right  heart strain.    LUNGS AND PLEURA: The central tracheobronchial tree is clear. Mild  bibasilar groundglass opacities are likely due to atelectasis. No  definite evidence for pulmonary infarct. No infiltrate or pleural  effusion. Left upper lobe nodule measuring 5 mm along an accessory  fissure (series 7, image 159).    MEDIASTINUM/AXILLAE: Filling defect in the right internal jugular vein  (series 5, image 14). Few small mediastinal lymph nodes and hilar  lymph nodes measuring up to 1.3 cm in the right lung hilum,  nonspecific and may be reactive. No thoracic aortic aneurysm. Severe  coronary artery calcifications. No pericardial effusion. Small hiatal  hernia.    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      Impression    IMPRESSION:  1.  Positive for multiple bilateral segmental pulmonary emboli. No CT  evidence for right heart strain.  2.  A filling defect in the right internal jugular vein may be due to  mixing artifact or thrombus. Consider right upper extremity venous  Doppler ultrasound.  3.  Left upper lobe 5 mm nodule. See follow-up guidance.  4.  Few small mediastinal and hilar  lymph nodes are likely reactive.    Recommendations for one or multiple incidental lung nodules < 6mm :    Low risk patients: No routine follow-up.    High risk patients: Optional follow-up CT at 12 months; if  unchanged, no further follow-up.    *Low Risk: Minimal or absent history of smoking or other known risk  factors.  *Nonsolid (ground glass) or partly solid nodules may require longer  follow-up to exclude indolent adenocarcinoma.  *Recommendations based on Guidelines for the Management of Incidental  Pulmonary Nodules Detected at CT: From the Fleischner Society 2017,  Radiology 2017.    Findings were discussed with Dr. Baker at 11:20 AM.    PRATIBHA STEPHENSON MD         SYSTEM ID:  [id number]   US Upper Extremity Venous Duplex Right    Narrative    US UPPER EXTREMITY VENOUS DUPLEX RIGHT  9/9/2022 1:16 PM     HISTORY: Pulmonary embolus, right shoulder pain.    COMPARISON: None.    TECHNIQUE: Color Doppler and spectral waveform analysis performed  throughout the deep veins of the right upper extremity.    FINDINGS: The right internal jugular, subclavian, axillary, and  brachial veins demonstrate normal blood flow, compression, and  augmentation. Basilic and cephalic veins are patent.      Impression    IMPRESSION: Negative for DVT in the right upper extremity.    US Lower Extremity Venous Duplex Bilateral    Narrative    VENOUS ULTRASOUND BILATERAL LOWER EXTREMITIES  9/9/2022 1:17 PM     HISTORY: Bilateral pulmonary embolus, evaluate for deep venous  thrombosis.     COMPARISON: None.    TECHNIQUE: Color Doppler and spectral waveform analysis performed  throughout the deep veins of both lower extremities.    FINDINGS: Both common femoral, proximal greater saphenous, femoral,  and popliteal veins demonstrate normal blood flow, compression, and  augmentation. Both posterior tibial veins are patent and the right  peroneal vein is patent. Occlusive thrombus seen in one of two left  peroneal veins.      Impression     IMPRESSION:  1. Positive for DVT in one of two left peroneal veins.  2. Negative for DVT in the right lower extremity.

## 2022-09-09 NOTE — PROGRESS NOTES
RECEIVING UNIT ED HANDOFF REVIEW    ED Nurse Handoff Report was reviewed by: Lui Scott RN on September 9, 2022 at 1:42 PM

## 2022-09-09 NOTE — ED TRIAGE NOTES
Patient was out walking this morning when she had a sudden onset of shortness of breath. No N/V. No lightheaded/dizziness. Improved with rest. Patient talking in full sentences. Patient denies vision changes. Patient reports some intermittent mild ache in right shoulder.

## 2022-09-09 NOTE — ED NOTES
"[facility]  ED Nurse Handoff Report    ED Chief complaint: Shortness of Breath      ED Diagnosis:   Final diagnoses:   None       Code Status: Full Code    Allergies:   Allergies   Allergen Reactions     Ciprofloxacin        Patient Story: Patient started having SOB this morning when she went for her walk. She couldn't walk off the SOB and went home. Checked her blood pressure at home and it was elevated.   Focused Assessment:  SOB upon exertion. Elevated BP    Treatments and/or interventions provided: IV established. Heparin started  Patient's response to treatments and/or interventions: none    To be done/followed up on inpatient unit:  Follow up on heparin and blood cluts    Does this patient have any cognitive concerns?: no    Activity level - Baseline/Home:  Independent  Activity Level - Current:   Independent    Patient's Preferred language: English   Needed?: No    Isolation: None  Infection: Not Applicable  Patient tested for COVID 19 prior to admission: YES  Bariatric?: No    Vital Signs:   Vitals:    09/09/22 0925 09/09/22 1000 09/09/22 1030   BP: (!) 174/94 (!) 170/112 (!) 145/132   Pulse: 115 105 95   Resp: 16 30 28   Temp: 97.4  F (36.3  C)     TempSrc: Temporal     SpO2: 93% 96% 96%   Weight: 72.6 kg (160 lb)     Height: 1.626 m (5' 4\")         Cardiac Rhythm:     Was the PSS-3 completed:   Yes  What interventions are required if any?               Family Comments: None  OBS brochure/video discussed/provided to patient/family: Yes              Name of person given brochure if not patient: N/a              Relationship to patient: N/a    For the majority of the shift this patient's behavior was Green.   Behavioral interventions performed were none.    ED NURSE PHONE NUMBER: [phone]       "

## 2022-09-09 NOTE — CONSULTS
Patient has Medicare D through [employer] with $480 (of $480) unmet deductible.    Xarelto/Eliquis  Sept: Upon receipt of RX, Discharge Pharmacy can dispense 1 month free.   Oct: $522 (fulfills $480 deductible)   Nov-Dec: $42/mo      Enoxaparin 80mg x 10 syringes: $157.     Jantoven (warfarin): $8/mo ($0/mo at [facility], [facility], [facility], [facility], vee)    -GLORIA Fam, Pharmacy Technician/Liaison, Discharge Pharmacy [phone]

## 2022-09-10 VITALS
RESPIRATION RATE: 18 BRPM | TEMPERATURE: 97.5 F | HEART RATE: 70 BPM | BODY MASS INDEX: 27.31 KG/M2 | OXYGEN SATURATION: 95 % | WEIGHT: 160 LBS | SYSTOLIC BLOOD PRESSURE: 126 MMHG | HEIGHT: 64 IN | DIASTOLIC BLOOD PRESSURE: 81 MMHG

## 2022-09-10 LAB
ANION GAP SERPL CALCULATED.3IONS-SCNC: 4 MMOL/L (ref 3–14)
BUN SERPL-MCNC: 15 MG/DL (ref 7–30)
CALCIUM SERPL-MCNC: 8.7 MG/DL (ref 8.5–10.1)
CHLORIDE BLD-SCNC: 109 MMOL/L (ref 94–109)
CO2 SERPL-SCNC: 25 MMOL/L (ref 20–32)
CREAT SERPL-MCNC: 0.53 MG/DL (ref 0.52–1.04)
ERYTHROCYTE [DISTWIDTH] IN BLOOD BY AUTOMATED COUNT: 12.8 % (ref 10–15)
GFR SERPL CREATININE-BSD FRML MDRD: >90 ML/MIN/1.73M2
GLUCOSE BLD-MCNC: 107 MG/DL (ref 70–99)
HCT VFR BLD AUTO: 42.9 % (ref 35–47)
HGB BLD-MCNC: 14 G/DL (ref 11.7–15.7)
MCH RBC QN AUTO: 31.1 PG (ref 26.5–33)
MCHC RBC AUTO-ENTMCNC: 32.6 G/DL (ref 31.5–36.5)
MCV RBC AUTO: 95 FL (ref 78–100)
PLATELET # BLD AUTO: 243 10E3/UL (ref 150–450)
POTASSIUM BLD-SCNC: 3.7 MMOL/L (ref 3.4–5.3)
RBC # BLD AUTO: 4.5 10E6/UL (ref 3.8–5.2)
SODIUM SERPL-SCNC: 138 MMOL/L (ref 133–144)
UFH PPP CHRO-ACNC: 0.76 IU/ML
WBC # BLD AUTO: 7.9 10E3/UL (ref 4–11)

## 2022-09-10 PROCEDURE — 250N000011 HC RX IP 250 OP 636: Performed by: INTERNAL MEDICINE

## 2022-09-10 PROCEDURE — 85027 COMPLETE CBC AUTOMATED: CPT | Performed by: INTERNAL MEDICINE

## 2022-09-10 PROCEDURE — 82310 ASSAY OF CALCIUM: CPT | Performed by: INTERNAL MEDICINE

## 2022-09-10 PROCEDURE — 99217 PR OBSERVATION CARE DISCHARGE: CPT | Performed by: PHYSICIAN ASSISTANT

## 2022-09-10 PROCEDURE — 36415 COLL VENOUS BLD VENIPUNCTURE: CPT | Performed by: INTERNAL MEDICINE

## 2022-09-10 PROCEDURE — 85520 HEPARIN ASSAY: CPT | Performed by: INTERNAL MEDICINE

## 2022-09-10 PROCEDURE — 250N000013 HC RX MED GY IP 250 OP 250 PS 637: Performed by: INTERNAL MEDICINE

## 2022-09-10 PROCEDURE — G0378 HOSPITAL OBSERVATION PER HR: HCPCS

## 2022-09-10 PROCEDURE — 96366 THER/PROPH/DIAG IV INF ADDON: CPT

## 2022-09-10 RX ADMIN — APIXABAN 10 MG: 5 TABLET, FILM COATED ORAL at 12:17

## 2022-09-10 RX ADMIN — HEPARIN SODIUM 900 UNITS/HR: 10000 INJECTION, SOLUTION INTRAVENOUS at 07:45

## 2022-09-10 ASSESSMENT — ACTIVITIES OF DAILY LIVING (ADL)
ADLS_ACUITY_SCORE: 31

## 2022-09-10 NOTE — PLAN OF CARE
Pt is discharging home at this time w/ all pt's belonging. AVS, meds and education given. Questions answered. Pt will drive self home.

## 2022-09-10 NOTE — DISCHARGE INSTRUCTIONS
Eliquis Oral Tablet 5 mg  Uses  This medicine is used for the following purposes:  prevent blood clots  blood clot  Instructions  This medicine may be taken with or without food.  It is very important that you take the medicine at about the same time every day. It will work best if you do this.  Store at room temperature in a dry place. Do not keep in the bathroom.  Keep the medicine away from heat and light.  Avoid grapefruit juice while on this medicine.  It is important that you keep taking each dose of this medicine on time even if you are feeling well.  If you forget to take a dose on time, take it as soon as you remember. If it is almost time for the next dose, do not take the missed dose. Return to your normal dosing schedule. Do not take 2 doses of this medicine at one time.  Please tell your doctor and pharmacist about all the medicines you take. Include both prescription and over-the-counter medicines. Also tell them about any vitamins, herbal medicines, or anything else you take for your health.  Do not suddenly stop taking this medicine. Check with your doctor before stopping.  It is very important that you follow your doctor's instructions for all blood tests.  Cautions  This medicine may cause serious bleeding problems in patients taking blood thinner medications. Follow your doctor's instructions carefully to monitor your blood lab tests if you are on blood thinners.  Tell your doctor and pharmacist if you ever had an allergic reaction to a medicine. Symptoms of an allergic reaction can include trouble breathing, skin rash, itching, swelling, or severe dizziness.  Some patients taking this medicine have experienced serious side effects. Please speak with your doctor to understand the risks and benefits associated with this medicine.  This medicine may cause serious bleeding from the stomach or bowels. Stop this medicine and call your doctor immediately if you see any signs of bleeding. Bleeding can  cause pain in the stomach, vomiting up liquid that looks like coffee grounds, and red or dark tarry stools.  There is an increased risk of bleeding while on this medicine, please tell your doctor or nurse if you notice any excessive bleeding or bruising.  Do not use the medication any more than instructed.  Speak with your doctor before taking any medicine with aspirin.  Please check with your doctor before drinking alcohol while on this medicine.  Tell the doctor or pharmacist if you are pregnant, planning to be pregnant, or breastfeeding.  Do not breastfeed while on this medicine.  This medicine can hurt a new baby in the womb. If you become pregnant while on this medicine, tell your doctor immediately. Your doctor may switch you to a different medicine.  Do not take [place]'s wort while on this medicine.  Ask your pharmacist if this medicine can interact with any of your other medicines. Be sure to tell them about all the medicines you take.  Please tell all your doctors and dentists that you are on this medicine before they provide care.  Do not start or stop any other medicines without first speaking to your doctor or pharmacist.  Call your doctor right away if you notice any unusual bleeding or bruising.  Do not share this medicine with anyone who has not been prescribed this medicine.  This medicine can cause serious side effects in some patients. Important information from the [place] Food and Drug Administration (FDA) is available from your pharmacist. Please review it carefully with your pharmacist to understand the risks associated with this medicine.  Always refill this medicine before it runs out.  Side Effects  The following is a list of some common side effects from this medicine. Please speak with your doctor about what you should do if you experience these or other side effects.  increased risk of bleeding  nosebleeds  Call your doctor or get medical help right away if you notice any of these more  serious side effects:  bleeding that is severe or takes longer to stop  unusual bruising or discoloration on skin  coughing up blood or vomit that looks like coffee grounds  fainting  severe or persistent headache  sudden leg pain, swelling, warmth or redness  loss of movement anywhere on the body  feeling of numbness or tingling in your hands and feet  shortness of breath  blood in stool  dark, tarry stool  symptoms of stroke (such as one-sided weakness, slurred speech, confusion)  difficulty swallowing  unusual or unexplained tiredness or weakness  blood in urine  blurring or changes of vision  A few people may have an allergic reactions to this medicine. Symptoms can include difficulty breathing, skin rash, itching, swelling, or severe dizziness. If you notice any of these symptoms, seek medical help quickly.  Extra  Please speak with your doctor, nurse, or pharmacist if you have any questions about this medicine.  [email or web address]  IMPORTANT NOTE: This document tells you briefly how to take your medicine, but it does not tell you all there is to know about it.Your doctor or pharmacist may give you other documents about your medicine. Please talk to them if you have any questions.Always follow their advice. There is a more complete description of this medicine available in English.Scan this code on your smartphone or tablet or use the web address below. You can also ask your pharmacist for a printout. If you have any questions, please ask your pharmacist.     2021 [facility] [employer]        Discharge Instructions for Pulmonary Embolism   A deep vein thrombosis (DVT) is a blood clot in a large vein deep in a leg, arm, or elsewhere in the body. The clot can separate from the vein, travel to the lungs and cut off blood flow. This is a pulmonary embolism (PE). Pulmonary embolism is very serious and may cause death if the clot is large or there are multiple clots.       Home care  Taking  care of yourself is very important. To help prevent more blood clots from forming, follow your healthcare provider's instructions. Do the following:   Take your medicines exactly as instructed. Don t skip doses. If you miss a dose, call your healthcare provider and ask what you should do.    Have all lab tests as recommended. This is very important when you take medicines to prevent blood clots.   If your healthcare provider has instructed you to do so, wear elastic (compression stockings).  Get up and get moving.  While sitting for long periods of time, move your knees, ankles, feet, and toes.  Lifestyle changes  To help prevent problems with your heart and blood vessels, do the following:    If you smoke, get help to quit. Talk with your healthcare provider about medicines and programs that can help.  Stay at a healthy weight. Talk to your healthcare provider about losing weight, if you are overweight  Try to exercise at least 30 minutes on most days. Before starting an exercise program, talk with your healthcare provider.   When traveling by car, make frequent stops to get up and move around.  On long airplane rides, get up and move around when possible. If you can t get up, wiggle your toes, move your ankles and tighten your calves to keep your blood moving.  Follow-up care  Make a follow-up appointment as directed. Have your lab work done as directed.   When to call your healthcare provider  Call your healthcare provider right away if you have:   Pain, swelling, and redness in your leg, arm, or other body area. These symptoms may mean another blood clot.  Blood in your urine  Bleeding with bowel movements  Bleeding from the nose, gums, a cut, or vagina  Call 911  Call  911 if you have symptoms of a blood clot in the lungs:    Chest pain  Trouble breathing  Coughing (may cough up blood)  Fast heartbeat  Sweating  Fainting  Also call 911 if you have:   Heavy or uncontrolled bleeding. If you are taking a blood  thinner, you have an increased chance of bleeding.  Stam[employer] last reviewed this educational content on 6/1/2019 2000-2021 [employer] All rights reserved. This information is not intended as a substitute for professional medical care. Always follow your healthcare professional's instructions.        Pulmonary Embolism (PE)  A pulmonary embolus is most often due to a blood clot that develops in a deep vein of the leg (deep vein thrombosis). If that clot breaks loose and travels to the lung, it's called a pulmonary embolism (PE). This can cut off blood flow in the lungs.   A blood clot in the lungs is a medical emergency and may cause death.    Healthcare providers use the term venous thromboembolism (VTE) to describe these 2 conditions: deep vein thrombosis and pulmonary embolism. They use the term VTE because the 2 conditions are very closely related. And, because their prevention and treatment are also closely related.       A pulmonary embolism occurs when a blood clot forms in a vein and travels to the lungs.   How is pulmonary embolism diagnosed?   Your healthcare provider examines you and asks about your symptoms and health history. You may also have one or more of the following:   Blood tests to check for blood clotting or other problems  Imaging tests to look for clots in the veins or lung  Electrocardiography (ECG) to test how well the heart is working  How is pulmonary embolism treated?  Blood-thinning medicines (anticoagulants). These medicines thin the blood. They may be given as a pill, as an injection, or through a tube into a vein (intravenous or IV). Blood thinners help prevent more blood clots from forming. They also help to prevent an existing clot from getting larger.  Thrombolysis. Thrombolytic medicines are used to quickly dissolve a blood clot. A long, narrow tube (catheter) is used to deliver medicine directly to the clot. Thrombolytic medicines increase the risk of bleeding  so they are used very carefully.  Inferior vena cava (IVC) filter surgery. The vena cava is the body s largest vein. It carries blood from the body to the heart. A small filter traps blood clots in the lower body and prevents them from traveling to the lungs. The filter is inserted into the vein through a catheter. The filter may be used if blood thinners can't be taken or if they don't work.   Pulmonary embolectomy. This is a procedure to remove a blood clot in the lungs. It may be done with surgery or with a catheter inserted in the body. It may be done when other treatments aren't safe or don't work.    What are the long-term concerns?  With treatment, blood clots are usually dissolved or removed. Some treatments can even help prevent future clots. But having a PE can put you at risk for another life-threatening blood clot. So you will likely need to take anticoagulants to help keep blood clots from forming again. You may need to take this medicine for months or years.   You may also need to make lifestyle changes. This may include getting more active and eating healthier. You may need to wear elastic (compression) stockings and take breaks on long trips.   Call 911  Call 911 or get emergency help if you have:   Heavy or uncontrolled bleeding  Symptoms of a blood clot that has traveled to the lungs. The symptoms include:  Chest pain  Trouble breathing  Coughing (may cough up blood)  Fainting  Fast heartbeat  Sweating    When to call your healthcare provider  Call your healthcare provider if you have swelling or pain in your leg, arm, or other area. These are symptoms of a blood clot.   You may have bleeding if you take medicine to help prevent blood clots.   Call your healthcare provider if you have symptoms of bleeding. This includes:   Blood in the urine  Bleeding with bowel movements  Bleeding from the nose, gums, a cut, or vagina  Yasmeen last reviewed this educational content on 2/1/2020 2000-2021 [employer] All rights reserved. This information is not intended as a substitute for professional medical care. Always follow your healthcare professional's instructions.

## 2022-09-10 NOTE — PROGRESS NOTES
Orientation/Cognitive: A/O x4   Observation Goals (Met/ Not Met): Not met  Mobility Level/Assist Equipment: SBA  Fall Risk (Y/N): Y  Behavior Concerns: Green   Pain Management: Denies pain   Tele/VS/O2: VSS on RA, ex HTN. Tele: SR w/ BBB.  ABNL Lab/BG: D-dimer 3.26, CT chest w/ PE's. US lower extremities abnormal.   Diet: Reg  Bowel/Bladder: Continent.   Skin Concerns: no   Drains/Devices: heparin gtt paused for 60min @06:33, restart @ 07:33 at 900U/hr, see orders   Tests/Procedures for next shift: Am labs, next Hep10 check @ 13:29pm  Anticipated DC date & active delays: 9/10??  Patient Stated Goal for Today: Rest.     Observation goals  PRIOR TO DISCHARGE       Comments:   -diagnostic tests and consults completed and resulted: Partially met     -vital signs normal or at patient baseline: Met     -dyspnea improved and O2 sats greater than 88% on room air or prior home oxygen levels : Met     -ultrasounds complete: Met     -transitioned to oral or SQ anticoagulation with pharmacy verifying discharge plan : Not met

## 2022-09-10 NOTE — PROGRESS NOTES
Observation goals  PRIOR TO DISCHARGE       Comments:   -diagnostic tests and consults completed and resulted: Met     -vital signs normal or at patient baseline: Met     -dyspnea improved and O2 sats greater than 88% on room air or prior home oxygen levels : Met     -ultrasounds complete: Met     -transitioned to oral or SQ anticoagulation with pharmacy verifying discharge plan : Met

## 2022-09-10 NOTE — DISCHARGE SUMMARY
[facility]    Discharge Summary  Hospitalist    Date of Admission:  9/9/2022  Date of Discharge:  9/10/2022  Discharging Provider: Tamica Pate PA-C, FAUSTO    Discharge Diagnoses   Bilateral pulmonary emboli  LLE DVT  Hypertension  Pulmonary nodule   Chronic PVCs  Insomnia  HLD  Mild hyperglycemia  Allergic rhinitis     History of Present Illness   Soledad Angel is an 77 year old female with history of hyperlipidemia on fish oil, hypertension not on treatment, anxiety, non-seasonal allergic rhinitis and PVC's who presents with shortness of breath and was found to have bilateral pulmonary emboli. On day of admission patient went for her usual walk when she developed unusual and persistent shortness of breath and mild light headedness that did not resolve with rest. She reports transient left calf pain a few days prior which resolved with topical medication. No swelling. No chest pain. She presented to the ED for evaluation and was diagnosed with DVT/PE.       Hospital Course   Soledad Angel was admitted on 9/9/2022.  The following problems were addressed during her hospitalization:    Bilateral pulmonary emboli   LLE DVT  CT scan showed multiple bilateral segmental pulmonary emboli without heart strain, a filling defect in the right internal jugular vein that may be due to mixing artifact or thrombus with recommendation to get right upper extremity venous doppler, a left upper lobe 5 mm nodule that will need follow up and a few small mediastinal and hilar lymph nodes that are likely reactive. EKG showed sinus tachycardia with ST depressions in V2 and V3 with no comparison and no symptoms of ischemia in setting of normal troponin and BNP.  US LE show DVT left peroneal veins. US RUE neg for DVT.   VSS, maintaining adequate oxygen saturation on room air   -on heparin drip, transitioned to po Eliquis at discharge. One month prescription provided at discharge, further refills per  PCP   -discontinue home aspirin that she takes for primary prevention while on blood   -defer ECHO given normal trop and BNP without evidence of heart strain on CT   -Follow-up with primary care physician to consider additional cancer screening work-up in the setting of new unprovoked pulmonary embolus but patient had recent colonoscopy last fall and normal mammograms with no signs of symptoms to suggest cancer, small pulmonary nodule was noticed on CT scan and patient did note a previous lung nodule in her lower lungs that was stable on repeat imaging a long time ago so this one seems new - would recommend repeat CT in 6 months. Could also consider hypercoagulable workup in the future      Benign essential hypertension.  Not on medications at home.  -Suspect acute hypertension is reactive from her PE. Improved through admission      Chronic PVC's.  -chronic and stable with minimal symptoms, no additional workup indicated     Insomnia  -hold PRN ativan while in the hospital to avoid confusion, can resume at home but should discuss with PCP if there is a better alternative for her insomnia like melatonin or an insomnia specific medication     Mixed hyperlipidemia  -hold home fish oil, discuss with PCP at follow up      Allergic rhinitis  -Hold home Claritin and flonase and can resume at discharge     Hyperglycemia  -Mild hyperglycemia noticed on admission and can follow-up with her primary care physician for consideration of A1c and diabetic screening     Incidental pulmonary nodule  -follow up with PCP and would recommend repeat CT in 6 months instead of the standard 12 month interval in setting of unprovoked PE    Patient was discussed with Dr. Matute who agrees with the above plan     Tamica Pate PA-C, PAAngelikaC    Significant Results and Procedures   See below     Code Status   Full Code       Primary Care Physician   CANDE HERRMANN    Physical Exam   Temp: 97.5  F (36.4  C) Temp src: Oral BP: 126/81 Pulse: 70    Resp: 18 SpO2: 95 % O2 Device: None (Room air)    Vitals:    09/09/22 0925   Weight: 72.6 kg (160 lb)     Vital Signs with Ranges  Temp:  [97.5  F (36.4  C)-97.9  F (36.6  C)] 97.5  F (36.4  C)  Pulse:  [56-70] 70  Resp:  [18-20] 18  BP: (121-142)/(81-95) 126/81  SpO2:  [93 %-96 %] 95 %  I/O last 3 completed shifts:  In: 1440 [P.O.:1440]  Out: -     Constitutional: Alert and oriented, sitting up in bed. Appears comfortable and is pleasantly conversant   ENT:  moist mucous membranes  Eyes:  Sclera anicteric, EOMI  Respiratory: Lungs clear to auscultation bilaterally, no increased work of breathing  Cardiovascular: Regular rate and rhythm, no murmur, no LE edema, distal pulses +2/4  GI:  active bowel sounds, abdomen soft, non-tender  Extremities:  Bilateral LE soft, non-tender without swelling or discoloration    MSK:  Normal ROM. Normal tone   Neuro:  Speech is clear. Face symmetric. Follows commands. No focal deficits     Discharge Disposition   Discharged to home  Condition at discharge: Stable    Consultations This Hospital Stay   PHARMACY IP CONSULT  PHARMACY IP CONSULT  PHARMACY IP CONSULT  PHARMACY LIAISON FOR MEDICATION COVERAGE CONSULT    Time Spent on this Encounter   I, Tamica Pate PA-C, personally saw the patient today and spent greater than 30 minutes discharging this patient.    Discharge Orders      Reason for your hospital stay    You were here for evaluation of shortness of breath in the setting of pulmonary emboli (blood clots in the lungs)     Follow-up and recommended labs and tests     Follow up with primary care provider next week for hospital follow up. Discuss aspirin and fish oil and whether further workup for cause of clot is indicated.     Activity    Your activity upon discharge: activity as tolerated- take it easy the next few days and avoid significant exertion. Listen to your body if stop if you are not tolerating.     Discharge Instructions    You should be re evaluated with  worsening pain or shortness of breath    You will take Eliquis 10mg twice daily for 7 days then switch to 5mg twice daily afterwards     Diet    Follow this diet upon discharge: Orders Placed This Encounter      Regular Diet Adult     Discharge Medications   Current Discharge Medication List      START taking these medications    Details   !! apixaban ANTICOAGULANT (ELIQUIS) 5 MG tablet Take 2 tablets (10 mg) by mouth 2 times daily for 7 days  Qty: 28 tablet, Refills: 0    Associated Diagnoses: Multiple subsegmental pulmonary emboli without acute cor pulmonale (H)      !! apixaban ANTICOAGULANT (ELIQUIS) 5 MG tablet Take 1 tablet (5 mg) by mouth 2 times daily for 23 days  Qty: 46 tablet, Refills: 0    Associated Diagnoses: Multiple subsegmental pulmonary emboli without acute cor pulmonale (H)       !! - Potential duplicate medications found. Please discuss with provider.      CONTINUE these medications which have NOT CHANGED    Details   Cholecalciferol (VITAMIN D-3 PO) Take 1 tablet by mouth daily      fish oil-omega-3 fatty acids 1000 MG capsule Take 2 g by mouth daily      fluticasone (FLONASE) 50 MCG/ACT nasal spray Spray 1 spray into both nostrils daily      loratadine (CLARITIN) 10 MG tablet Take 10 mg by mouth daily      LORazepam (ATIVAN) 0.5 MG tablet Take 0.25 mg by mouth nightly as needed (sleep)         STOP taking these medications       aspirin 81 MG EC tablet Comments:   Reason for Stopping:             Allergies   Allergies   Allergen Reactions     Ciprofloxacin      Data   Most Recent 3 CBC's:Recent Labs   Lab Test 09/10/22  0541 09/09/22  0940 10/06/15  1835   WBC 7.9 6.9 9.5   HGB 14.0 14.9 14.1   MCV 95 94 90    249 256      Most Recent 3 BMP's:  Recent Labs   Lab Test 09/10/22  0541 09/09/22  0940 10/06/15  1835    138 138   POTASSIUM 3.7 3.8 3.7   CHLORIDE 109 107 105   CO2 25 24 24   BUN 15 13 14   CR 0.53 0.54 0.64   ANIONGAP 4 7 9   AP 8.7 9.1 9.0   * 126* 101*     Most  Recent 2 LFT's:  Recent Labs   Lab Test 05/15/15  1334   AST 19   ALT 27   ALKPHOS 103   BILITOTAL 0.7     Most Recent INR's and Anticoagulation Dosing History:  Anticoagulation Dose History    There is no flowsheet data to display.       Most Recent 3 Troponin's:No lab results found.  Most Recent Cholesterol Panel:No lab results found.  Most Recent 6 Bacteria Isolates From Any Culture (See EPIC Reports for Culture Details):No lab results found.  Most Recent TSH, T4 and A1c Labs:No lab results found.  Results for orders placed or performed during the hospital encounter of 09/09/22   CT Chest Pulmonary Embolism w Contrast    Narrative    CT CHEST PULMONARY EMBOLISM W CONTRAST 9/9/2022 11:10 AM    CLINICAL HISTORY: Shortness of breath, tachycardia, elevated D-dimer  TECHNIQUE: CT angiogram chest during arterial phase injection IV  contrast. 2D and 3D MIP reconstructions were performed by the CT  technologist. Dose reduction techniques were used.     CONTRAST: 63 mL [id number]    COMPARISON: None.    FINDINGS:  ANGIOGRAM CHEST: Filling defects in multiple bilateral segmental  pulmonary arteries consistent with bilateral pulmonary emboli.  Thoracic aorta is negative for dissection. No CT evidence of right  heart strain.    LUNGS AND PLEURA: The central tracheobronchial tree is clear. Mild  bibasilar groundglass opacities are likely due to atelectasis. No  definite evidence for pulmonary infarct. No infiltrate or pleural  effusion. Left upper lobe nodule measuring 5 mm along an accessory  fissure (series 7, image 159).    MEDIASTINUM/AXILLAE: Filling defect in the right internal jugular vein  (series 5, image 14). Few small mediastinal lymph nodes and hilar  lymph nodes measuring up to 1.3 cm in the right lung hilum,  nonspecific and may be reactive. No thoracic aortic aneurysm. Severe  coronary artery calcifications. No pericardial effusion. Small hiatal  hernia.    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      Impression    IMPRESSION:  1.  Positive for multiple bilateral segmental pulmonary emboli. No CT  evidence for right heart strain.  2.  A filling defect in the right internal jugular vein may be due to  mixing artifact or thrombus. Consider right upper extremity venous  Doppler ultrasound.  3.  Left upper lobe 5 mm nodule. See follow-up guidance.  4.  Few small mediastinal and hilar lymph nodes are likely reactive.    Recommendations for one or multiple incidental lung nodules < 6mm :    Low risk patients: No routine follow-up.    High risk patients: Optional follow-up CT at 12 months; if  unchanged, no further follow-up.    *Low Risk: Minimal or absent history of smoking or other known risk  factors.  *Nonsolid (ground glass) or partly solid nodules may require longer  follow-up to exclude indolent adenocarcinoma.  *Recommendations based on Guidelines for the Management of Incidental  Pulmonary Nodules Detected at CT: From the Fleischner Society 2017,  Radiology 2017.    Findings were discussed with Dr. Baker at 11:20 AM.    PRATIBHA STEPHENSON MD         SYSTEM ID:  [id number]   US Upper Extremity Venous Duplex Right    Narrative    US UPPER EXTREMITY VENOUS DUPLEX RIGHT  9/9/2022 1:16 PM     HISTORY: Pulmonary embolus, right shoulder pain.    COMPARISON: None.    TECHNIQUE: Color Doppler and spectral waveform analysis performed  throughout the deep veins of the right upper extremity.    FINDINGS: The right internal jugular, subclavian, axillary, and  brachial veins demonstrate normal blood flow, compression, and  augmentation. Basilic and cephalic veins are patent.      Impression    IMPRESSION: Negative for DVT in the right upper extremity.     MESERET HOANG MD         SYSTEM ID:  [id number]   US Lower Extremity Venous Duplex Bilateral    Narrative    VENOUS ULTRASOUND BILATERAL LOWER EXTREMITIES  9/9/2022 1:17 PM      HISTORY: Bilateral pulmonary embolus, evaluate for deep venous  thrombosis.     COMPARISON: None.    TECHNIQUE: Color Doppler and spectral waveform analysis performed  throughout the deep veins of both lower extremities.    FINDINGS: Both common femoral, proximal greater saphenous, femoral,  and popliteal veins demonstrate normal blood flow, compression, and  augmentation. Both posterior tibial veins are patent and the right  peroneal vein is patent. Occlusive thrombus seen in one of two left  peroneal veins.      Impression    IMPRESSION:  1. Positive for DVT in one of two left peroneal veins.  2. Negative for DVT in the right lower extremity.     MESERET HOANG MD         SYSTEM ID:  [id number]

## (undated) RX ORDER — FENTANYL CITRATE 50 UG/ML
INJECTION, SOLUTION INTRAMUSCULAR; INTRAVENOUS
Status: DISPENSED
Start: 2017-10-18